# Patient Record
Sex: MALE | Race: OTHER | HISPANIC OR LATINO | Employment: UNEMPLOYED | URBAN - METROPOLITAN AREA
[De-identification: names, ages, dates, MRNs, and addresses within clinical notes are randomized per-mention and may not be internally consistent; named-entity substitution may affect disease eponyms.]

---

## 2019-08-30 ENCOUNTER — OCCMED (OUTPATIENT)
Dept: URGENT CARE | Age: 19
End: 2019-08-30
Payer: OTHER MISCELLANEOUS

## 2019-08-30 ENCOUNTER — APPOINTMENT (OUTPATIENT)
Dept: RADIOLOGY | Age: 19
End: 2019-08-30
Attending: FAMILY MEDICINE
Payer: OTHER MISCELLANEOUS

## 2019-08-30 DIAGNOSIS — T14.90XA INJURY: Primary | ICD-10-CM

## 2019-08-30 DIAGNOSIS — T14.90XA INJURY: ICD-10-CM

## 2019-08-30 PROCEDURE — 73130 X-RAY EXAM OF HAND: CPT

## 2019-08-30 PROCEDURE — G0382 LEV 3 HOSP TYPE B ED VISIT: HCPCS | Performed by: FAMILY MEDICINE

## 2019-08-30 PROCEDURE — 99283 EMERGENCY DEPT VISIT LOW MDM: CPT | Performed by: FAMILY MEDICINE

## 2019-09-27 ENCOUNTER — TRANSCRIBE ORDERS (OUTPATIENT)
Dept: ADMINISTRATIVE | Facility: HOSPITAL | Age: 19
End: 2019-09-27

## 2019-09-27 DIAGNOSIS — M25.539 PAIN IN WRIST, UNSPECIFIED LATERALITY: Primary | ICD-10-CM

## 2020-08-18 ENCOUNTER — APPOINTMENT (EMERGENCY)
Dept: RADIOLOGY | Facility: HOSPITAL | Age: 20
End: 2020-08-18

## 2020-08-18 ENCOUNTER — HOSPITAL ENCOUNTER (EMERGENCY)
Facility: HOSPITAL | Age: 20
Discharge: HOME | End: 2020-08-18
Attending: EMERGENCY MEDICINE | Admitting: EMERGENCY MEDICINE

## 2020-08-18 VITALS
SYSTOLIC BLOOD PRESSURE: 142 MMHG | RESPIRATION RATE: 18 BRPM | OXYGEN SATURATION: 99 % | TEMPERATURE: 97.7 F | HEART RATE: 79 BPM | DIASTOLIC BLOOD PRESSURE: 79 MMHG

## 2020-08-18 DIAGNOSIS — S93.491A SPRAIN OF ANTERIOR TALOFIBULAR LIGAMENT, RIGHT, INITIAL ENCOUNTER: Primary | ICD-10-CM

## 2020-08-18 PROCEDURE — 99283 EMERGENCY DEPT VISIT LOW MDM: CPT

## 2020-08-18 PROCEDURE — 99284 EMERGENCY DEPT VISIT MOD MDM: CPT | Performed by: EMERGENCY MEDICINE

## 2020-08-18 PROCEDURE — 96372 THER/PROPH/DIAG INJ SC/IM: CPT

## 2020-08-18 PROCEDURE — 73610 X-RAY EXAM OF ANKLE: CPT

## 2020-08-18 RX ORDER — ACETAMINOPHEN 325 MG/1
975 TABLET ORAL ONCE
Status: COMPLETED | OUTPATIENT
Start: 2020-08-18 | End: 2020-08-18

## 2020-08-18 RX ORDER — KETOROLAC TROMETHAMINE 30 MG/ML
15 INJECTION, SOLUTION INTRAMUSCULAR; INTRAVENOUS ONCE
Status: COMPLETED | OUTPATIENT
Start: 2020-08-18 | End: 2020-08-18

## 2020-08-18 RX ADMIN — KETOROLAC TROMETHAMINE 15 MG: 30 INJECTION, SOLUTION INTRAMUSCULAR at 22:38

## 2020-08-18 RX ADMIN — ACETAMINOPHEN 975 MG: 325 TABLET ORAL at 22:37

## 2020-08-19 NOTE — ED ATTENDING ATTESTATION
8/18/2020  IGreta DO, saw and evaluated the patient  I have discussed the patient with the resident/non-physician practitioner and agree with the resident's/non-physician practitioner's findings, Plan of Care, and MDM as documented in the resident's/non-physician practitioner's note, except where noted  All available labs and Radiology studies were reviewed  I was present for key portions of any procedure(s) performed by the resident/non-physician practitioner and I was immediately available to provide assistance  At this point I agree with the current assessment done in the Emergency Department  I have conducted an independent evaluation of this patient a history and physical is as follows:    ED Course     Pt seen and examined  Healthy 22 yo male with 2 weeks of R ankle pain after inversion injury while playing softball  He states he felt immediate pain after twisting but was able to ambulate afterwards  Has tried RICE which was initially helpful but he is still having pain and swelling  Tender R lateral malleoli, NV intact distally  Will check xray  Xray neg for acute findings, no fractuers noted       Final diagnoses:   Sprain of anterior talofibular ligament, right, initial encounter         Critical Care Time  Procedures

## 2020-08-19 NOTE — ED PROVIDER NOTES
History  Chief Complaint   Patient presents with    Ankle Injury     injured right ankle two weeks ago playing softball, no deformity     24-year-old male no relevant past medical history presenting to the ED for evaluation of 2 weeks of right ankle pain after inversion injury while playing softball  He states he felt immediate pain but was able to ambulate afterwards  Utilized ibuprofen at home, elevation, and ice initially and not somewhat helped but he is still having pain and swelling  He denies any systemic symptoms such as fever chills  Review of systems otherwise negative  He states he did not come and then because he thought it would improve, and states he came in tonight because his symptoms have persisted  No previous injuries or surgeries to the right ankle  He denies any midfoot tenderness, no numbness, tingling or weakness, no color change, no proximal fibular or tibia pain  His pain is made worse with ambulation, and dorsiflexion/plantar flexion of his right ankle  He is endorsing his pain as a constant dull ache  History provided by:  Patient   used: No    Ankle Injury   Onset quality:  Sudden  Duration:  2 weeks  Timing:  Constant  Progression:  Partially resolved  Chronicity:  New  Associated symptoms: no fever        None       History reviewed  No pertinent past medical history  History reviewed  No pertinent surgical history  History reviewed  No pertinent family history  I have reviewed and agree with the history as documented  E-Cigarette/Vaping     E-Cigarette/Vaping Substances     Social History     Tobacco Use    Smoking status: Not on file   Substance Use Topics    Alcohol use: Not on file    Drug use: Not on file        Review of Systems   Constitutional: Negative  Negative for fever  HENT: Negative  Eyes: Negative  Respiratory: Negative  Cardiovascular: Negative  Gastrointestinal: Negative  Endocrine: Negative  Genitourinary: Negative  Musculoskeletal: Positive for arthralgias  Skin: Negative  Allergic/Immunologic: Negative  Neurological: Negative  Hematological: Negative  Psychiatric/Behavioral: Negative  Physical Exam  ED Triage Vitals [08/18/20 2220]   Temperature Pulse Respirations Blood Pressure SpO2   97 7 °F (36 5 °C) 79 18 142/79 99 %      Temp src Heart Rate Source Patient Position - Orthostatic VS BP Location FiO2 (%)   -- Monitor -- -- --      Pain Score       --             Orthostatic Vital Signs  Vitals:    08/18/20 2220   BP: 142/79   Pulse: 79       Physical Exam  Vitals signs reviewed  Constitutional:       General: He is not in acute distress  Appearance: Normal appearance  He is normal weight  HENT:      Head: Normocephalic and atraumatic  Nose: Nose normal    Eyes:      General: No scleral icterus  Extraocular Movements: Extraocular movements intact  Conjunctiva/sclera: Conjunctivae normal    Neck:      Musculoskeletal: Normal range of motion  Cardiovascular:      Rate and Rhythm: Normal rate and regular rhythm  Pulses: Normal pulses  Pulmonary:      Effort: Pulmonary effort is normal  No respiratory distress  Abdominal:      General: Abdomen is flat  Musculoskeletal: Normal range of motion  Right ankle: He exhibits swelling and ecchymosis  He exhibits normal range of motion and no deformity  Tenderness  Lateral malleolus and AITFL tenderness found  No CF ligament, no posterior TFL, no head of 5th metatarsal and no proximal fibula tenderness found  Achilles tendon exhibits no pain, no defect and normal Gordon's test results  Comments: Normal ROM R ankle, motor/sensory intact  2+ DP/PT pulses b/l LEs  Ecchymosis/swelling overlying ATFL w/ tenderness, mild tenderness over lat malleolus  Skin:     General: Skin is warm and dry  Capillary Refill: Capillary refill takes less than 2 seconds     Neurological:      General: No focal deficit present  Mental Status: He is alert and oriented to person, place, and time  Psychiatric:         Mood and Affect: Mood normal          Behavior: Behavior normal          Thought Content: Thought content normal          Judgment: Judgment normal          ED Medications  Medications   ketorolac (TORADOL) injection 15 mg (15 mg Intramuscular Given 8/18/20 2238)   acetaminophen (TYLENOL) tablet 975 mg (975 mg Oral Given 8/18/20 2237)       Diagnostic Studies  Results Reviewed     None                 XR ankle 3+ views RIGHT   ED Interpretation by Lino Moss DO (08/18 2248)   No acute osseous abnormality              Procedures  Splint application    Date/Time: 8/18/2020 11:02 PM  Performed by: Lino Moss DO  Authorized by: Lino Moss DO     Patient location:  Bedside  Performing Provider:  Tech  Consent:     Consent obtained:  Verbal    Consent given by:  Patient  Universal protocol:     Procedure explained and questions answered to patient or proxy's satisfaction: yes    Indication:     Indications: sprain/strain    Pre-procedure details:     Sensation:  Normal  Procedure details:     Laterality:  Right    Location:  Ankle    Ankle:  R ankle    Splint type: aircast ankle splint  Post-procedure details:     Pain:  Improved    Sensation:  Normal    Neurovascular Exam: skin pink, capillary refill <2 sec, normal pulses and skin intact, warm, and dry      Patient tolerance of procedure: Tolerated well, no immediate complications          ED Course       US AUDIT      Most Recent Value   Initial Alcohol Screen: US AUDIT-C    1  How often do you have a drink containing alcohol?  0 Filed at: 08/18/2020 2220   2  How many drinks containing alcohol do you have on a typical day you are drinking? 0 Filed at: 08/18/2020 2220   3a  Male UNDER 65: How often do you have five or more drinks on one occasion? 0 Filed at: 08/18/2020 2220   3b  FEMALE Any Age, or MALE 65+:  How often do you have 4 or more drinks on one occassion? 0 Filed at: 08/18/2020 2220   Audit-C Score  0 Filed at: 08/18/2020 2220                  PRERNA/DAST-10      Most Recent Value   How many times in the past year have you    Used an illegal drug or used a prescription medication for non-medical reasons? Never Filed at: 08/18/2020 2220                              [facility]  Number of Diagnoses or Management Options  Sprain of anterior talofibular ligament, right, initial encounter: new and requires workup  Diagnosis management comments: Well-appearing 25-year-old male presenting 2 weeks after right ankle injury  He is neurovascularly intact normal range of motion with mild swelling and tenderness over the ATFL, slight tenderness over the lateral malleolus  Obtain x-ray, give Tylenol and Toradol for pain  No fractures  Normal alignment  Aircast splint applied by tech, improved comfort  Discharge home, patient given written instructions, told to follow-up with orthopedic surgery if his symptoms persist   Questions were answered prior to discharge  Amount and/or Complexity of Data Reviewed  Tests in the radiology section of CPT®: ordered and reviewed  Review and summarize past medical records: yes  Independent visualization of images, tracings, or specimens: yes          Disposition  Final diagnoses:   Sprain of anterior talofibular ligament, right, initial encounter     Time reflects when diagnosis was documented in both MDM as applicable and the Disposition within this note     Time User Action Codes Description Comment    8/18/2020 10:48 PM Meri Treviño Add [N79 118D] Sprain of anterior talofibular ligament, right, initial encounter       ED Disposition     ED Disposition Condition Date/Time Comment    Discharge Stable Tue Aug 18, 2020 10:49 PM Bill Burton discharge to home/self care              Follow-up Information     Follow up With Specialties Details Why Contact Info Additional [address] [facility] Emergency Department Emergency Medicine Go to  As needed, If symptoms worsen Connie [id number] [phone] [place] ED, [address] [facility] [address]  , [place], [place], [address] Orthopedic Surgery Call in 1 week  [address]  [facility] [place] [id number] [phone] [address], [address], [address] [place] [address], [id number]   [phone]          There are no discharge medications for this patient  No discharge procedures on file  PDMP Review     None           ED Provider  Attending physically available and evaluated Lucy Plaza I managed the patient along with the ED Attending      Electronically Signed by         Maria E Rodriges DO  08/18/20 1804

## 2020-08-19 NOTE — DISCHARGE INSTRUCTIONS
tome tylenol e ibuprofeno según sea necesario para el dolor  puede aplicar ny almohadilla térmica en el tobillo para aliviar el dolor y la hinchazón  cuando esté en casa, eleve el tobillo para mejorar la hinchazón  use lynn férula para mayor comodidad, soporte de peso según lo tolere  realice un seguimiento con ortopedia en 1 semana si shannan síntomas persisten

## 2021-01-15 ENCOUNTER — HOSPITAL ENCOUNTER (EMERGENCY)
Facility: HOSPITAL | Age: 21
Discharge: HOME | End: 2021-01-15
Attending: EMERGENCY MEDICINE

## 2021-01-15 VITALS
WEIGHT: 206.13 LBS | HEART RATE: 76 BPM | TEMPERATURE: 98.5 F | OXYGEN SATURATION: 100 % | SYSTOLIC BLOOD PRESSURE: 143 MMHG | DIASTOLIC BLOOD PRESSURE: 85 MMHG | RESPIRATION RATE: 16 BRPM

## 2021-01-15 DIAGNOSIS — N52.9 INABILITY TO MAINTAIN ERECTION: Primary | ICD-10-CM

## 2021-01-15 LAB
ANION GAP SERPL CALCULATED.3IONS-SCNC: 5 MMOL/L (ref 4–13)
BASOPHILS # BLD AUTO: 0.04 THOUSANDS/ΜL (ref 0–0.1)
BASOPHILS NFR BLD AUTO: 1 % (ref 0–1)
BILIRUB UR QL STRIP: NEGATIVE
BUN SERPL-MCNC: 13 MG/DL (ref 5–25)
CALCIUM SERPL-MCNC: 9.7 MG/DL (ref 8.3–10.1)
CHLORIDE SERPL-SCNC: 101 MMOL/L (ref 100–108)
CLARITY UR: CLEAR
CLARITY, POC: CLEAR
CO2 SERPL-SCNC: 31 MMOL/L (ref 21–32)
COLOR UR: YELLOW
COLOR, POC: YELLOW
CREAT SERPL-MCNC: 1.02 MG/DL (ref 0.6–1.3)
EOSINOPHIL # BLD AUTO: 0.07 THOUSAND/ΜL (ref 0–0.61)
EOSINOPHIL NFR BLD AUTO: 1 % (ref 0–6)
ERYTHROCYTE [DISTWIDTH] IN BLOOD BY AUTOMATED COUNT: 13.9 % (ref 11.6–15.1)
GFR SERPL CREATININE-BSD FRML MDRD: 121 ML/MIN/1.73SQ M
GLUCOSE SERPL-MCNC: 103 MG/DL (ref 65–140)
GLUCOSE UR STRIP-MCNC: NEGATIVE MG/DL
HCT VFR BLD AUTO: 51.7 % (ref 36.5–49.3)
HGB BLD-MCNC: 16.5 G/DL (ref 12–17)
HGB UR QL STRIP.AUTO: NEGATIVE
IMM GRANULOCYTES # BLD AUTO: 0.02 THOUSAND/UL (ref 0–0.2)
IMM GRANULOCYTES NFR BLD AUTO: 0 % (ref 0–2)
KETONES UR STRIP-MCNC: NEGATIVE MG/DL
LEUKOCYTE ESTERASE UR QL STRIP: NEGATIVE
LYMPHOCYTES # BLD AUTO: 2.3 THOUSANDS/ΜL (ref 0.6–4.47)
LYMPHOCYTES NFR BLD AUTO: 28 % (ref 14–44)
MCH RBC QN AUTO: 25.7 PG (ref 26.8–34.3)
MCHC RBC AUTO-ENTMCNC: 31.9 G/DL (ref 31.4–37.4)
MCV RBC AUTO: 80 FL (ref 82–98)
MONOCYTES # BLD AUTO: 0.73 THOUSAND/ΜL (ref 0.17–1.22)
MONOCYTES NFR BLD AUTO: 9 % (ref 4–12)
NEUTROPHILS # BLD AUTO: 5.05 THOUSANDS/ΜL (ref 1.85–7.62)
NEUTS SEG NFR BLD AUTO: 61 % (ref 43–75)
NITRITE UR QL STRIP: NEGATIVE
NRBC BLD AUTO-RTO: 0 /100 WBCS
PH UR STRIP.AUTO: 6.5 [PH] (ref 4.5–8)
PLATELET # BLD AUTO: 249 THOUSANDS/UL (ref 149–390)
PMV BLD AUTO: 9.9 FL (ref 8.9–12.7)
POTASSIUM SERPL-SCNC: 4.1 MMOL/L (ref 3.5–5.3)
PROT UR STRIP-MCNC: NEGATIVE MG/DL
RBC # BLD AUTO: 6.43 MILLION/UL (ref 3.88–5.62)
SODIUM SERPL-SCNC: 137 MMOL/L (ref 136–145)
SP GR UR STRIP.AUTO: 1.01 (ref 1–1.03)
UROBILINOGEN UR QL STRIP.AUTO: 0.2 E.U./DL
WBC # BLD AUTO: 8.21 THOUSAND/UL (ref 4.31–10.16)

## 2021-01-15 PROCEDURE — 36415 COLL VENOUS BLD VENIPUNCTURE: CPT | Performed by: EMERGENCY MEDICINE

## 2021-01-15 PROCEDURE — 85025 COMPLETE CBC W/AUTO DIFF WBC: CPT | Performed by: EMERGENCY MEDICINE

## 2021-01-15 PROCEDURE — 80048 BASIC METABOLIC PNL TOTAL CA: CPT | Performed by: EMERGENCY MEDICINE

## 2021-01-15 PROCEDURE — 81003 URINALYSIS AUTO W/O SCOPE: CPT

## 2021-01-15 PROCEDURE — 99283 EMERGENCY DEPT VISIT LOW MDM: CPT

## 2021-01-15 PROCEDURE — 99282 EMERGENCY DEPT VISIT SF MDM: CPT | Performed by: EMERGENCY MEDICINE

## 2021-01-15 NOTE — Clinical Note
Abelardo Walter was seen and treated in our emergency department on 1/15/2021  Diagnosis:     Wendy Lopez  may return to work on return date  He may return on this date: 01/18/2021         If you have any questions or concerns, please don't hesitate to call        Ana Rosa Medina MD    ______________________________           _______________          _______________  Hospital Representative                              Date                                Time

## 2021-01-16 NOTE — ED PROVIDER NOTES
History  Chief Complaint   Patient presents with    Medical Problem     cyracom blue phone used for interpretation  pt states he is here to get his sugar and his blood pressure checked  when asked why he has concern for issues with sugar or BP pts states, "because for the last week or so I am having trouble getting it up in my parts down there and I saw red "  pt also reports his mouth tastes sweet and concerned because he has been drinking alcohol for the last month   when asked if he wants help for it he continues to speak about his penis problem and text on phone      18-year-old male presented for evaluation of erectile dysfunction  Patient states that over this past month he has been drinking more alcohol than normal   He states that several times last week he was unable to obtain a good quality erection on several occasions  States it is working but not like it usually does this seems to coincide with the heavy alcohol use  He otherwise has no complaints  He specifically denies headache or neck pain, numbness, weakness, tingling, chest pain, shortness of breath nausea, vomiting, diarrhea, or urinary symptoms  No testicular pain or swelling  No penile discharge  He is requesting to [facility] my blood sugar checked        History provided by:  Patient   used: No    Medical Problem  Location:  Penis  Quality:  Limp  Severity:  Mild  Onset quality:  Gradual  Timing:  Intermittent  Progression:  Improving  Chronicity:  New  Context:  EtOH intox  Relieved by:  Abstaining from alcohol  Worsened by:  Drinking alcohol  Ineffective treatments:  None tried  Associated symptoms: no abdominal pain, no chest pain, no cough, no ear pain, no fever, no nausea, no rash, no shortness of breath, no sore throat and no vomiting        None       History reviewed  No pertinent past medical history  History reviewed  No pertinent surgical history  History reviewed  No pertinent family history    I have reviewed and agree with the history as documented  E-Cigarette/Vaping     E-Cigarette/Vaping Substances     Social History     Tobacco Use    Smoking status: Never Smoker    Smokeless tobacco: Never Used   Substance Use Topics    Alcohol use: Yes    Drug use: Yes       Review of Systems   Constitutional: Negative for chills and fever  HENT: Negative for ear pain and sore throat  Eyes: Negative for pain and visual disturbance  Respiratory: Negative for cough and shortness of breath  Cardiovascular: Negative for chest pain and palpitations  Gastrointestinal: Negative for abdominal pain, nausea and vomiting  Genitourinary: Negative for dysuria and hematuria  Musculoskeletal: Negative for arthralgias and back pain  Skin: Negative for color change and rash  Neurological: Negative for seizures and syncope  All other systems reviewed and are negative  Physical Exam  Physical Exam  Vitals signs and nursing note reviewed  Constitutional:       Appearance: He is well-developed  HENT:      Head: Normocephalic and atraumatic  Mouth/Throat:      Mouth: Mucous membranes are moist       Pharynx: Oropharynx is clear  Eyes:      Conjunctiva/sclera: Conjunctivae normal    Neck:      Musculoskeletal: Normal range of motion and neck supple  No neck rigidity or muscular tenderness  Cardiovascular:      Rate and Rhythm: Normal rate and regular rhythm  Heart sounds: No murmur  Pulmonary:      Effort: Pulmonary effort is normal  No respiratory distress  Breath sounds: Normal breath sounds  Abdominal:      Palpations: Abdomen is soft  Tenderness: There is no abdominal tenderness  Musculoskeletal: Normal range of motion  General: No swelling or tenderness  Skin:     General: Skin is warm and dry  Capillary Refill: Capillary refill takes less than 2 seconds  Neurological:      General: No focal deficit present        Mental Status: He is alert and oriented to person, place, and time           Vital Signs  ED Triage Vitals   Temperature Pulse Respirations Blood Pressure SpO2   01/15/21 1724 01/15/21 1724 01/15/21 1724 01/15/21 1724 01/15/21 1724   98 5 °F (36 9 °C) 82 16 149/86 99 %      Temp Source Heart Rate Source Patient Position - Orthostatic VS BP Location FiO2 (%)   01/15/21 1724 01/15/21 1956 01/15/21 1956 01/15/21 1956 --   Oral Monitor Sitting Right arm       Pain Score       --                  Vitals:    01/15/21 1724 01/15/21 1956   BP: 149/86 143/85   Pulse: 82 76   Patient Position - Orthostatic VS:  Sitting         Visual Acuity      ED Medications  Medications - No data to display    Diagnostic Studies  Results Reviewed     Procedure Component Value Units Date/Time    Basic metabolic panel [[id number]] Collected: 01/15/21 1919    Lab Status: Final result Specimen: Blood from Arm, Left Updated: 01/15/21 1942     Sodium 137 mmol/L      Potassium 4 1 mmol/L      Chloride 101 mmol/L      CO2 31 mmol/L      ANION GAP 5 mmol/L      BUN 13 mg/dL      Creatinine 1 02 mg/dL      Glucose 103 mg/dL      Calcium 9 7 mg/dL      eGFR 121 ml/min/1 73sq m     Narrative:      Noemí guidelines for Chronic Kidney Disease (CKD):     Stage 1 with normal or high GFR (GFR > 90 mL/min/1 73 square meters)    Stage 2 Mild CKD (GFR = 60-89 mL/min/1 73 square meters)    Stage 3A Moderate CKD (GFR = 45-59 mL/min/1 73 square meters)    Stage 3B Moderate CKD (GFR = 30-44 mL/min/1 73 square meters)    Stage 4 Severe CKD (GFR = 15-29 mL/min/1 73 square meters)    Stage 5 End Stage CKD (GFR <15 mL/min/1 73 square meters)  Note: GFR calculation is accurate only with a steady state creatinine    POCT urinalysis dipstick [[id number]]  (Normal) Resulted: 01/15/21 1924    Lab Status: Final result Specimen: Urine Updated: 01/15/21 1924     Color, UA yellow     Clarity, UA clear    CBC and differential [[id number]]  (Abnormal) Collected: 01/15/21 1919 Lab Status: Final result Specimen: Blood from Arm, Left Updated: 01/15/21 1924     WBC 8 21 Thousand/uL      RBC 6 43 Million/uL      Hemoglobin 16 5 g/dL      Hematocrit 51 7 %      MCV 80 fL      MCH 25 7 pg      MCHC 31 9 g/dL      RDW 13 9 %      MPV 9 9 fL      Platelets 154 Thousands/uL      nRBC 0 /100 WBCs      Neutrophils Relative 61 %      Immat GRANS % 0 %      Lymphocytes Relative 28 %      Monocytes Relative 9 %      Eosinophils Relative 1 %      Basophils Relative 1 %      Neutrophils Absolute 5 05 Thousands/µL      Immature Grans Absolute 0 02 Thousand/uL      Lymphocytes Absolute 2 30 Thousands/µL      Monocytes Absolute 0 73 Thousand/µL      Eosinophils Absolute 0 07 Thousand/µL      Basophils Absolute 0 04 Thousands/µL     Urine Macroscopic, POC [[id number]] Collected: 01/15/21 1922    Lab Status: Final result Specimen: Urine Updated: 01/15/21 1923     Color, UA Yellow     Clarity, UA Clear     pH, UA 6 5     Leukocytes, UA Negative     Nitrite, UA Negative     Protein, UA Negative mg/dl      Glucose, UA Negative mg/dl      Ketones, UA Negative mg/dl      Urobilinogen, UA 0 2 E U /dl      Bilirubin, UA Negative     Blood, UA Negative     Specific Gravity, UA 1 010    Narrative:      CLIN[facility] RESULT                 No orders to display              Procedures  Procedures         ED Course                                           MDM  Number of Diagnoses or Management Options  Inability to maintain erection: new and requires workup  Diagnosis management comments: A 60-year-old male came requesting laboratory evaluation for diabetes after having some difficulty maintaining a normal erection after drinking a lot of alcohol  His laboratory studies are unremarkable  Urinalysis is also unremarkable  Patient has no other complaints  We will plan to discharge with PCP follow-up  Discussed return precautions         Amount and/or Complexity of Data Reviewed  Review and summarize past medical records: yes        Disposition  Final diagnoses:   Inability to maintain erection     Time reflects when diagnosis was documented in both MDM as applicable and the Disposition within this note     Time User Action Codes Description Comment    1/15/2021  7:56 PM Betzy Whiteside Add [N52 9] Inability to maintain erection       ED Disposition     ED Disposition Condition Date/Time Comment    Discharge Stable Fri Mani 15, 2021  7:56 PM Claudeen Postal discharge to home/self care  Follow-up Information     Follow up With Specialties Details Why [address] Family Medicine   [address] [place] [address]  [phone]            Patient's Medications    No medications on file     No discharge procedures on file      PDMP Review     None          ED Provider  Electronically Signed by           Rosy Sandoval MD  01/15/21 1958

## 2021-04-15 ENCOUNTER — HOSPITAL ENCOUNTER (EMERGENCY)
Facility: HOSPITAL | Age: 21
Discharge: HOME | End: 2021-04-15
Attending: EMERGENCY MEDICINE | Admitting: EMERGENCY MEDICINE
Payer: COMMERCIAL

## 2021-04-15 ENCOUNTER — APPOINTMENT (EMERGENCY)
Dept: RADIOLOGY | Facility: HOSPITAL | Age: 21
End: 2021-04-15
Payer: COMMERCIAL

## 2021-04-15 VITALS
DIASTOLIC BLOOD PRESSURE: 78 MMHG | OXYGEN SATURATION: 100 % | SYSTOLIC BLOOD PRESSURE: 149 MMHG | HEART RATE: 61 BPM | WEIGHT: 205.03 LBS | RESPIRATION RATE: 16 BRPM | TEMPERATURE: 97.9 F

## 2021-04-15 DIAGNOSIS — R07.89 CHEST WALL PAIN: Primary | ICD-10-CM

## 2021-04-15 DIAGNOSIS — F41.8 ANXIETY ABOUT HEALTH: ICD-10-CM

## 2021-04-15 LAB
ATRIAL RATE: 70 BPM
GLUCOSE SERPL-MCNC: 99 MG/DL (ref 65–140)
P AXIS: 36 DEGREES
PR INTERVAL: 162 MS
QRS AXIS: 8 DEGREES
QRSD INTERVAL: 96 MS
QT INTERVAL: 404 MS
QTC INTERVAL: 436 MS
T WAVE AXIS: 1 DEGREES
VENTRICULAR RATE: 70 BPM

## 2021-04-15 PROCEDURE — 82948 REAGENT STRIP/BLOOD GLUCOSE: CPT

## 2021-04-15 PROCEDURE — 93005 ELECTROCARDIOGRAM TRACING: CPT

## 2021-04-15 PROCEDURE — 99285 EMERGENCY DEPT VISIT HI MDM: CPT | Performed by: EMERGENCY MEDICINE

## 2021-04-15 PROCEDURE — 71046 X-RAY EXAM CHEST 2 VIEWS: CPT

## 2021-04-15 PROCEDURE — 99285 EMERGENCY DEPT VISIT HI MDM: CPT

## 2021-04-15 PROCEDURE — 93010 ELECTROCARDIOGRAM REPORT: CPT | Performed by: INTERNAL MEDICINE

## 2021-04-15 RX ORDER — NAPROXEN 500 MG/1
500 TABLET ORAL EVERY 12 HOURS PRN
Qty: 15 TABLET | Refills: 0 | Status: SHIPPED | OUTPATIENT
Start: 2021-04-15 | End: 2021-06-19

## 2021-04-15 NOTE — ED PROVIDER NOTES
History  Chief Complaint   Patient presents with    Chest Pain     Patient reports chest pain and left arm pain  23-year-old male with no past medical history presents to the ED with chest pain for a while  He states it is getting worse over the last couple a days  He states around the center of his chest and radiates to the left shoulder  He cannot describe the pain  No associated diaphoresis, shortness of breath or vomiting  The chest pain is not exertional   He states he gets worse when he stretches his arms  He did not take anything for the pain  Patient also would like to be checked for diabetes  History provided by:  Patient   used: Yes    Chest Pain  Pain location:  Substernal area  Pain quality comment:  Cannot describe  Pain radiates to:  L shoulder  Pain radiates to the back: no    Pain severity:  Unable to specify  Onset quality:  Unable to specify  Timing:  Constant  Progression:  Worsening  Chronicity:  New  Context: at rest    Context comment:  Stretching arms  Relieved by:  None tried  Worsened by:  Certain positions  Ineffective treatments:  None tried  Associated symptoms: no abdominal pain, no altered mental status, no anorexia, no anxiety, no back pain, no claudication, no cough, no diaphoresis, no dizziness, no dysphagia, no fatigue, no fever, no headache, no heartburn, no lower extremity edema, no nausea, no near-syncope, no numbness, no orthopnea, no palpitations, no PND, no shortness of breath, no syncope, not vomiting and no weakness    Risk factors: male sex    Risk factors: no coronary artery disease, no diabetes mellitus, no high cholesterol, no hypertension, no immobilization, not obese, no prior DVT/PE, no smoking and no surgery        None       History reviewed  No pertinent past medical history  History reviewed  No pertinent surgical history  History reviewed  No pertinent family history    I have reviewed and agree with the history as documented  E-Cigarette/Vaping     E-Cigarette/Vaping Substances     Social History     Tobacco Use    Smoking status: Never Smoker    Smokeless tobacco: Never Used   Substance Use Topics    Alcohol use: Yes    Drug use: Yes       Review of Systems   Constitutional: Negative  Negative for diaphoresis, fatigue and fever  HENT: Negative  Negative for trouble swallowing  Eyes: Negative  Respiratory: Negative  Negative for cough and shortness of breath  Cardiovascular: Positive for chest pain  Negative for palpitations, orthopnea, claudication, leg swelling, syncope, PND and near-syncope  Gastrointestinal: Negative  Negative for abdominal pain, anorexia, heartburn, nausea and vomiting  Genitourinary: Negative  Musculoskeletal: Negative for back pain and neck pain  Skin: Negative  Allergic/Immunologic: Negative  Neurological: Negative  Negative for dizziness, weakness, numbness and headaches  Hematological: Negative  Psychiatric/Behavioral: Negative  All other systems reviewed and are negative  Physical Exam  Physical Exam  Vitals signs and nursing note reviewed  Constitutional:       General: He is awake  He is not in acute distress  Appearance: Normal appearance  He is well-developed and normal weight  He is not ill-appearing, toxic-appearing or diaphoretic  HENT:      Head: Normocephalic and atraumatic  Right Ear: External ear normal       Left Ear: External ear normal       Nose: Nose normal    Eyes:      General: No scleral icterus  Conjunctiva/sclera: Conjunctivae normal    Neck:      Thyroid: No thyromegaly  Vascular: No JVD  Cardiovascular:      Rate and Rhythm: Normal rate and regular rhythm  Heart sounds: Normal heart sounds  No murmur  No friction rub  No gallop  Pulmonary:      Effort: Pulmonary effort is normal  No respiratory distress  Breath sounds: Normal breath sounds  No stridor  No wheezing, rhonchi or rales  Chest:      Chest wall: Tenderness present  Abdominal:      General: Bowel sounds are normal  There is no distension  Palpations: Abdomen is soft  There is no mass  Tenderness: There is no abdominal tenderness  Hernia: No hernia is present  Musculoskeletal: Normal range of motion  General: No tenderness or deformity  Right lower leg: No edema  Left lower leg: No edema  Skin:     General: Skin is warm and dry  Coloration: Skin is not jaundiced or pale  Findings: No bruising, erythema, lesion or rash  Neurological:      General: No focal deficit present  Mental Status: He is alert and oriented to person, place, and time  Motor: No weakness  Deep Tendon Reflexes: Reflexes are normal and symmetric  Psychiatric:         Mood and Affect: Mood normal          Behavior: Behavior is cooperative           Vital Signs  ED Triage Vitals [04/15/21 0757]   Temperature Pulse Respirations Blood Pressure SpO2   97 9 °F (36 6 °C) 61 16 149/78 100 %      Temp Source Heart Rate Source Patient Position - Orthostatic VS BP Location FiO2 (%)   Oral Monitor -- -- --      Pain Score       6           Vitals:    04/15/21 0757   BP: 149/78   Pulse: 61         Visual Acuity      ED Medications  Medications - No data to display    Diagnostic Studies  Results Reviewed     None                 XR chest 2 views    (Results Pending)              Procedures  ECG 12 Lead Documentation Only    Date/Time: 4/15/2021 8:20 AM  Performed by: Samina Rosa DO  Authorized by: Samina Rosa DO     Indications / Diagnosis:  Cp  ECG reviewed by me, the ED Provider: yes    Patient location:  ED  Interpretation:     Interpretation: normal    Rate:     ECG rate:  70    ECG rate assessment: normal    Rhythm:     Rhythm: sinus rhythm    Ectopy:     Ectopy: PVCs      PVCs:  Infrequent  Conduction:     Conduction: normal    ST segments:     ST segments:  Normal  T waves:     T waves: normal               ED Course                             SBIRT 20yo+      Most Recent Value   SBIRT (22 yo +)   In order to provide better care to our patients, we are screening all of our patients for alcohol and drug use  Would it be okay to ask you these screening questions? Yes Filed at: 04/15/2021 4364   Initial Alcohol Screen: US AUDIT-C    1  How often do you have a drink containing alcohol?  0 Filed at: 04/15/2021 0802   2  How many drinks containing alcohol do you have on a typical day you are drinking? 0 Filed at: 04/15/2021 0802   3a  Male UNDER 65: How often do you have five or more drinks on one occasion? 0 Filed at: 04/15/2021 0802   3b  FEMALE Any Age, or MALE 65+: How often do you have 4 or more drinks on one occassion? 0 Filed at: 04/15/2021 0802   Audit-C Score  0 Filed at: 04/15/2021 9935   PRERNA: How many times in the past year have you    Used an illegal drug or used a prescription medication for non-medical reasons? Never Filed at: 04/15/2021 0802                    MDM  Number of Diagnoses or Management Options  Diagnosis management comments: 71-year-old male presents with central chest pain for a while getting worse over the last few days  He states it does radiate to the left shoulder but no other associated symptoms  The pain seems to get worse when he stretches his arms  He did not take anything for the pain  On exam he is alert no acute distress  His exam here is normal   He really has no risk factors for ACS  I suspect his symptoms are secondary to musculoskeletal pain  He would also like to be checked for diabetes although he has no symptoms consistent with diabetes    Will check EKG, chest x-ray and Accu-Chek       Amount and/or Complexity of Data Reviewed  Clinical lab tests: ordered and reviewed  Tests in the radiology section of CPT®: ordered and reviewed  Independent visualization of images, tracings, or specimens: yes        Disposition  Final diagnoses:   None ED Disposition     None      Follow-up Information    None         Patient's Medications    No medications on file     No discharge procedures on file      PDMP Review     None          ED Provider  Electronically Signed by           Darlene Wayne DO  04/15/21 7945

## 2021-04-28 ENCOUNTER — HOSPITAL ENCOUNTER (EMERGENCY)
Facility: HOSPITAL | Age: 21
Discharge: HOME | End: 2021-04-28
Attending: EMERGENCY MEDICINE | Admitting: EMERGENCY MEDICINE
Payer: COMMERCIAL

## 2021-04-28 VITALS
RESPIRATION RATE: 18 BRPM | TEMPERATURE: 98.1 F | OXYGEN SATURATION: 99 % | SYSTOLIC BLOOD PRESSURE: 154 MMHG | HEART RATE: 68 BPM | DIASTOLIC BLOOD PRESSURE: 71 MMHG

## 2021-04-28 DIAGNOSIS — Z00.00 NORMAL PHYSICAL EXAM: Primary | ICD-10-CM

## 2021-04-28 DIAGNOSIS — R52 GENERALIZED PAIN: ICD-10-CM

## 2021-04-28 LAB
ANION GAP SERPL CALCULATED.3IONS-SCNC: 9 MMOL/L (ref 4–13)
BASOPHILS # BLD AUTO: 0.05 THOUSANDS/ΜL (ref 0–0.1)
BASOPHILS NFR BLD AUTO: 1 % (ref 0–1)
BUN SERPL-MCNC: 16 MG/DL (ref 5–25)
CALCIUM SERPL-MCNC: 9.2 MG/DL (ref 8.3–10.1)
CHLORIDE SERPL-SCNC: 102 MMOL/L (ref 100–108)
CO2 SERPL-SCNC: 27 MMOL/L (ref 21–32)
CREAT SERPL-MCNC: 0.96 MG/DL (ref 0.6–1.3)
EOSINOPHIL # BLD AUTO: 0.05 THOUSAND/ΜL (ref 0–0.61)
EOSINOPHIL NFR BLD AUTO: 1 % (ref 0–6)
ERYTHROCYTE [DISTWIDTH] IN BLOOD BY AUTOMATED COUNT: 13.3 % (ref 11.6–15.1)
GFR SERPL CREATININE-BSD FRML MDRD: 112 ML/MIN/1.73SQ M
GLUCOSE SERPL-MCNC: 96 MG/DL (ref 65–140)
HCT VFR BLD AUTO: 47.2 % (ref 36.5–49.3)
HGB BLD-MCNC: 15.3 G/DL (ref 12–17)
IMM GRANULOCYTES # BLD AUTO: 0.01 THOUSAND/UL (ref 0–0.2)
IMM GRANULOCYTES NFR BLD AUTO: 0 % (ref 0–2)
LYMPHOCYTES # BLD AUTO: 2.67 THOUSANDS/ΜL (ref 0.6–4.47)
LYMPHOCYTES NFR BLD AUTO: 36 % (ref 14–44)
MCH RBC QN AUTO: 26.1 PG (ref 26.8–34.3)
MCHC RBC AUTO-ENTMCNC: 32.4 G/DL (ref 31.4–37.4)
MCV RBC AUTO: 80 FL (ref 82–98)
MONOCYTES # BLD AUTO: 0.64 THOUSAND/ΜL (ref 0.17–1.22)
MONOCYTES NFR BLD AUTO: 9 % (ref 4–12)
NEUTROPHILS # BLD AUTO: 3.99 THOUSANDS/ΜL (ref 1.85–7.62)
NEUTS SEG NFR BLD AUTO: 53 % (ref 43–75)
NRBC BLD AUTO-RTO: 0 /100 WBCS
PLATELET # BLD AUTO: 246 THOUSANDS/UL (ref 149–390)
PMV BLD AUTO: 10 FL (ref 8.9–12.7)
POTASSIUM SERPL-SCNC: 3.7 MMOL/L (ref 3.5–5.3)
RBC # BLD AUTO: 5.87 MILLION/UL (ref 3.88–5.62)
SODIUM SERPL-SCNC: 138 MMOL/L (ref 136–145)
TSH SERPL DL<=0.05 MIU/L-ACNC: 1.91 UIU/ML (ref 0.36–3.74)
WBC # BLD AUTO: 7.41 THOUSAND/UL (ref 4.31–10.16)

## 2021-04-28 PROCEDURE — 85025 COMPLETE CBC W/AUTO DIFF WBC: CPT | Performed by: EMERGENCY MEDICINE

## 2021-04-28 PROCEDURE — 84443 ASSAY THYROID STIM HORMONE: CPT | Performed by: EMERGENCY MEDICINE

## 2021-04-28 PROCEDURE — 80048 BASIC METABOLIC PNL TOTAL CA: CPT | Performed by: EMERGENCY MEDICINE

## 2021-04-28 PROCEDURE — 86308 HETEROPHILE ANTIBODY SCREEN: CPT | Performed by: EMERGENCY MEDICINE

## 2021-04-28 PROCEDURE — 86618 LYME DISEASE ANTIBODY: CPT | Performed by: EMERGENCY MEDICINE

## 2021-04-28 PROCEDURE — 36415 COLL VENOUS BLD VENIPUNCTURE: CPT | Performed by: EMERGENCY MEDICINE

## 2021-04-28 PROCEDURE — 99283 EMERGENCY DEPT VISIT LOW MDM: CPT

## 2021-04-28 PROCEDURE — 99284 EMERGENCY DEPT VISIT MOD MDM: CPT | Performed by: EMERGENCY MEDICINE

## 2021-04-28 RX ORDER — IBUPROFEN 600 MG/1
600 TABLET ORAL ONCE
Status: COMPLETED | OUTPATIENT
Start: 2021-04-28 | End: 2021-04-28

## 2021-04-28 RX ADMIN — IBUPROFEN 600 MG: 600 TABLET, FILM COATED ORAL at 21:31

## 2021-04-29 LAB — HETEROPH AB SER QL: NEGATIVE

## 2021-04-29 NOTE — ED PROVIDER NOTES
History  Chief Complaint   Patient presents with    Pain     "every single day my body hurts", "all of my body", "i want to check everything because its not normal", able to ambulate, laughing in triage, appears in no distress     23 yo male presents to ED asking for "everything to be checked", explaining that he has not felt normal for at least 1 month, characterized by feeling tired every day, and keeps having discomfort he localizes to his chest and says it is "pulsing", but also c/o body pain from head to toe, without fever, chills  He affirms nausea, no vomiting, no diarrhea  He denies any sick contacts  He has been to EDs twice in the interval since he describeds onset of these symptoms for somatic complaints  ED screening exams to date have been reassuring  Today he said he wants me to check his "veins", and points to neck veins and arm veins  He is otherwise in no distress, and has a normal physical exam and normal vital signs  I explained the ED goal to screen for acute medical conditions requiring emergent intervention, and I can check for some chronic illnesses common to his age and environment, namely Lyme or Pratt  He continued to perseverate on checking "everything" so I explained that there is not a corresponding workup that will meet his expectation  History provided by:  Patient      Prior to Admission Medications   Prescriptions Last Dose Informant Patient Reported? Taking?   naproxen (NAPROSYN) 500 mg tablet   No No   Sig: Take 1 tablet (500 mg total) by mouth every 12 (twelve) hours as needed for mild pain or moderate pain      Facility-Administered Medications: None       History reviewed  No pertinent past medical history  History reviewed  No pertinent surgical history  History reviewed  No pertinent family history  I have reviewed and agree with the history as documented      E-Cigarette/Vaping     E-Cigarette/Vaping Substances     Social History     Tobacco Use    Smoking status: Never Smoker    Smokeless tobacco: Never Used   Substance Use Topics    Alcohol use: Yes    Drug use: Yes       Review of Systems   Constitutional: Negative for appetite change, chills and fever  HENT: Negative for sore throat  Respiratory: Negative for cough, shortness of breath and wheezing  Cardiovascular: Negative for chest pain and palpitations  Gastrointestinal: Negative for abdominal pain, diarrhea, nausea and vomiting  Genitourinary: Negative for dysuria and hematuria  Musculoskeletal: Negative for neck pain  Skin: Negative for rash  Neurological: Negative for dizziness, weakness and headaches  Psychiatric/Behavioral: Negative for suicidal ideas  All other systems reviewed and are negative  Physical Exam  Physical Exam  Vitals signs and nursing note reviewed  Constitutional:       Appearance: Normal appearance  He is well-developed  He is not toxic-appearing or diaphoretic  HENT:      Head: Normocephalic  Right Ear: Tympanic membrane and external ear normal       Left Ear: External ear normal       Nose: Nose normal    Eyes:      General: Lids are normal       Conjunctiva/sclera: Conjunctivae normal       Pupils: Pupils are equal, round, and reactive to light  Neck:      Musculoskeletal: Normal range of motion and neck supple  Vascular: No JVD  Meningeal: Brudzinski's sign and Kernig's sign absent  Cardiovascular:      Rate and Rhythm: Normal rate and regular rhythm  Heart sounds: Normal heart sounds  No murmur  Pulmonary:      Effort: Pulmonary effort is normal  No tachypnea, accessory muscle usage or respiratory distress  Breath sounds: Normal breath sounds  No wheezing  Abdominal:      General: Bowel sounds are normal  There is no distension  Palpations: Abdomen is soft  Abdomen is not rigid  There is no mass  Tenderness: There is no abdominal tenderness  There is no guarding or rebound     Musculoskeletal: Normal range of motion  Lymphadenopathy:      Head:      Right side of head: No submental, submandibular, preauricular or posterior auricular adenopathy  Left side of head: No submental, submandibular, preauricular or posterior auricular adenopathy  Cervical: No cervical adenopathy  Skin:     General: Skin is warm and dry  Findings: No rash  Neurological:      Mental Status: He is alert and oriented to person, place, and time  GCS: GCS eye subscore is 4  GCS verbal subscore is 5  GCS motor subscore is 6  Cranial Nerves: No cranial nerve deficit  Sensory: No sensory deficit  Coordination: Coordination normal       Deep Tendon Reflexes: Reflexes are normal and symmetric  Psychiatric:         Speech: Speech normal          Behavior: Behavior normal          Thought Content: Thought content normal          Vital Signs  ED Triage Vitals [04/28/21 1936]   Temperature Pulse Respirations Blood Pressure SpO2   98 1 °F (36 7 °C) 68 18 154/71 99 %      Temp src Heart Rate Source Patient Position - Orthostatic VS BP Location FiO2 (%)   -- Monitor -- -- --      Pain Score       No Pain           Vitals:    04/28/21 1936   BP: 154/71   Pulse: 68         Visual Acuity      ED Medications  Medications   ibuprofen (MOTRIN) tablet 600 mg (600 mg Oral Given 4/28/21 2131)       Diagnostic Studies  Results Reviewed     Procedure Component Value Units Date/Time    TSH [[id number]]  (Normal) Collected: 04/28/21 2131    Lab Status: Final result Specimen: Blood from Arm, Right Updated: 04/28/21 2201     TSH 3RD GENERATON 1 910 uIU/mL     Narrative:      Patients undergoing fluorescein dye angiography may retain small amounts of fluorescein in the body for 48-72 hours post procedure  Samples containing fluorescein can produce falsely depressed TSH values  If the patient had this procedure,a specimen should be resubmitted post fluorescein clearance        Basic metabolic panel [[id number]] Collected: 04/28/21 2131 Lab Status: Final result Specimen: Blood from Arm, Right Updated: 04/28/21 2201     Sodium 138 mmol/L      Potassium 3 7 mmol/L      Chloride 102 mmol/L      CO2 27 mmol/L      ANION GAP 9 mmol/L      BUN 16 mg/dL      Creatinine 0 96 mg/dL      Glucose 96 mg/dL      Calcium 9 2 mg/dL      eGFR 112 ml/min/1 73sq m     Narrative:      [place] guidelines for Chronic Kidney Disease (CKD):     Stage 1 with normal or high GFR (GFR > 90 mL/min/1 73 square meters)    Stage 2 Mild CKD (GFR = 60-89 mL/min/1 73 square meters)    Stage 3A Moderate CKD (GFR = 45-59 mL/min/1 73 square meters)    Stage 3B Moderate CKD (GFR = 30-44 mL/min/1 73 square meters)    Stage 4 Severe CKD (GFR = 15-29 mL/min/1 73 square meters)    Stage 5 End Stage CKD (GFR <15 mL/min/1 73 square meters)  Note: GFR calculation is accurate only with a steady state creatinine    CBC and differential [[id number]]  (Abnormal) Collected: 04/28/21 2131    Lab Status: Final result Specimen: Blood from Arm, Right Updated: 04/28/21 2136     WBC 7 41 Thousand/uL      RBC 5 87 Million/uL      Hemoglobin 15 3 g/dL      Hematocrit 47 2 %      MCV 80 fL      MCH 26 1 pg      MCHC 32 4 g/dL      RDW 13 3 %      MPV 10 0 fL      Platelets 203 Thousands/uL      nRBC 0 /100 WBCs      Neutrophils Relative 53 %      Immat GRANS % 0 %      Lymphocytes Relative 36 %      Monocytes Relative 9 %      Eosinophils Relative 1 %      Basophils Relative 1 %      Neutrophils Absolute 3 99 Thousands/µL      Immature Grans Absolute 0 01 Thousand/uL      Lymphocytes Absolute 2 67 Thousands/µL      Monocytes Absolute 0 64 Thousand/µL      Eosinophils Absolute 0 05 Thousand/µL      Basophils Absolute 0 05 Thousands/µL     Lyme Antibody Profile with reflex to [employer] [[id number]] Collected: 04/28/21 2131    Lab Status: In process Specimen: Blood from Arm, Right Updated: 04/28/21 2134    Mononucleosis screen [[id number]] Collected: 04/28/21 2131    Lab Status:  In process Specimen: Blood from Arm, Right Updated: 04/28/21 2134                 No orders to display              Procedures  Procedures         ED Course                             SBIRT 22yo+      Most Recent Value   SBIRT (23 yo +)   In order to provide better care to our patients, we are screening all of our patients for alcohol and drug use  Would it be okay to ask you these screening questions? No Filed at: 04/28/2021 2037                    MDM    Disposition  Final diagnoses:   Normal physical exam   Generalized pain     Time reflects when diagnosis was documented in both MDM as applicable and the Disposition within this note     Time User Action Codes Description Comment    4/28/2021 10:09 PM Florez Boehringer Add [R53 83] Fatigue     4/28/2021 10:09 PM Gene Contes L Add [Z00 00] Normal physical exam     4/28/2021 10:09 PM Florez Boehringer Modify [Z00 00] Normal physical exam     4/28/2021 10:09 PM Florez Boehringer Remove [R53 83] Fatigue     4/28/2021 10:10 PM Florez Boehringer Add [R53 83] Fatigue     4/28/2021 10:10 PM [place] [R53 83] Fatigue     4/28/2021 10:10 PM Florez Boehringer Add [R52] Generalized pain       ED Disposition     ED Disposition Condition Date/Time Comment    Discharge Good Wed Apr 28, 2021 10:09 PM Jeffrey Smith discharge to home/self care  Follow-up Information     Follow up With Specialties Details Why Contact Info    [employer]  Call  For followup information with primary care [phone]            Discharge Medication List as of 4/28/2021 10:13 PM      CONTINUE these medications which have NOT CHANGED    Details   naproxen (NAPROSYN) 500 mg tablet Take 1 tablet (500 mg total) by mouth every 12 (twelve) hours as needed for mild pain or moderate pain, Starting Thu 4/15/2021, Print           No discharge procedures on file      PDMP Review     None          ED Provider  Electronically Signed by           Helen Joe MD  04/28/21 [address]

## 2021-04-29 NOTE — DISCHARGE INSTRUCTIONS
Tu examen es normal    Los análisis de bismark son FRUFÄLLAN  Envié ny prueba de la enfermedad de Lyme y [place] prueba del monovirus  Esos resultados estarán disponibles en unos gris  Debe hacer un seguimiento con un médico de atención primaria si continúa experimentando síntomas  Puede regresar al servicio de urgencias por problemas de emergencia

## 2021-04-30 LAB — B BURGDOR IGG+IGM SER-ACNC: 29

## 2021-05-01 ENCOUNTER — HOSPITAL ENCOUNTER (EMERGENCY)
Facility: HOSPITAL | Age: 21
Discharge: HOME | End: 2021-05-01
Attending: EMERGENCY MEDICINE
Payer: COMMERCIAL

## 2021-05-01 ENCOUNTER — APPOINTMENT (EMERGENCY)
Dept: RADIOLOGY | Facility: HOSPITAL | Age: 21
End: 2021-05-01
Payer: COMMERCIAL

## 2021-05-01 VITALS
OXYGEN SATURATION: 100 % | RESPIRATION RATE: 18 BRPM | TEMPERATURE: 97.1 F | SYSTOLIC BLOOD PRESSURE: 138 MMHG | DIASTOLIC BLOOD PRESSURE: 83 MMHG | HEART RATE: 95 BPM

## 2021-05-01 DIAGNOSIS — R07.9 RECURRENT CHEST PAIN: ICD-10-CM

## 2021-05-01 DIAGNOSIS — R07.89 CHEST WALL PAIN: Primary | ICD-10-CM

## 2021-05-01 LAB
ANION GAP SERPL CALCULATED.3IONS-SCNC: 10 MMOL/L (ref 5–14)
ATRIAL RATE: 81 BPM
BASOPHILS # BLD AUTO: 0.1 THOUSANDS/ΜL (ref 0–0.1)
BASOPHILS NFR BLD AUTO: 1 % (ref 0–1)
BUN SERPL-MCNC: 15 MG/DL (ref 5–25)
CALCIUM SERPL-MCNC: 10.3 MG/DL (ref 8.4–10.2)
CHLORIDE SERPL-SCNC: 103 MMOL/L (ref 97–108)
CO2 SERPL-SCNC: 28 MMOL/L (ref 22–30)
CREAT SERPL-MCNC: 0.99 MG/DL (ref 0.7–1.5)
EOSINOPHIL # BLD AUTO: 0 THOUSAND/ΜL (ref 0–0.4)
EOSINOPHIL NFR BLD AUTO: 0 % (ref 0–6)
ERYTHROCYTE [DISTWIDTH] IN BLOOD BY AUTOMATED COUNT: 13.9 %
GFR SERPL CREATININE-BSD FRML MDRD: 108 ML/MIN/1.73SQ M
GLUCOSE SERPL-MCNC: 96 MG/DL (ref 70–99)
HCT VFR BLD AUTO: 46.2 % (ref 41–53)
HGB BLD-MCNC: 15.2 G/DL (ref 13.5–17.5)
LYMPHOCYTES # BLD AUTO: 2.1 THOUSANDS/ΜL (ref 0.5–4)
LYMPHOCYTES NFR BLD AUTO: 22 % (ref 25–45)
MCH RBC QN AUTO: 26 PG (ref 26–34)
MCHC RBC AUTO-ENTMCNC: 32.9 G/DL (ref 31–36)
MCV RBC AUTO: 79 FL (ref 80–100)
MONOCYTES # BLD AUTO: 0.5 THOUSAND/ΜL (ref 0.2–0.9)
MONOCYTES NFR BLD AUTO: 6 % (ref 1–10)
NEUTROPHILS # BLD AUTO: 6.5 THOUSANDS/ΜL (ref 1.8–7.8)
NEUTS SEG NFR BLD AUTO: 71 % (ref 45–65)
P AXIS: 56 DEGREES
PLATELET # BLD AUTO: 243 THOUSANDS/UL (ref 150–450)
PLATELET BLD QL SMEAR: ADEQUATE
PMV BLD AUTO: 8.6 FL (ref 8.9–12.7)
POTASSIUM SERPL-SCNC: 3.5 MMOL/L (ref 3.6–5)
PR INTERVAL: 158 MS
QRS AXIS: 40 DEGREES
QRSD INTERVAL: 84 MS
QT INTERVAL: 362 MS
QTC INTERVAL: 420 MS
RBC # BLD AUTO: 5.83 MILLION/UL (ref 4.5–5.9)
RBC MORPH BLD: NORMAL
SODIUM SERPL-SCNC: 141 MMOL/L (ref 137–147)
T WAVE AXIS: 20 DEGREES
TROPONIN I SERPL-MCNC: <0.01 NG/ML (ref 0–0.03)
VENTRICULAR RATE: 81 BPM
WBC # BLD AUTO: 9.2 THOUSAND/UL (ref 4.5–11)

## 2021-05-01 PROCEDURE — 36415 COLL VENOUS BLD VENIPUNCTURE: CPT | Performed by: PHYSICIAN ASSISTANT

## 2021-05-01 PROCEDURE — 84484 ASSAY OF TROPONIN QUANT: CPT | Performed by: PHYSICIAN ASSISTANT

## 2021-05-01 PROCEDURE — 93005 ELECTROCARDIOGRAM TRACING: CPT

## 2021-05-01 PROCEDURE — 85025 COMPLETE CBC W/AUTO DIFF WBC: CPT | Performed by: PHYSICIAN ASSISTANT

## 2021-05-01 PROCEDURE — 99285 EMERGENCY DEPT VISIT HI MDM: CPT

## 2021-05-01 PROCEDURE — 99285 EMERGENCY DEPT VISIT HI MDM: CPT | Performed by: PHYSICIAN ASSISTANT

## 2021-05-01 PROCEDURE — 80048 BASIC METABOLIC PNL TOTAL CA: CPT | Performed by: PHYSICIAN ASSISTANT

## 2021-05-01 PROCEDURE — 93010 ELECTROCARDIOGRAM REPORT: CPT | Performed by: INTERNAL MEDICINE

## 2021-05-01 PROCEDURE — 71045 X-RAY EXAM CHEST 1 VIEW: CPT

## 2021-05-01 NOTE — ED PROVIDER NOTES
History  Chief Complaint   Patient presents with    Chest Pain     started today 20 min pta, mid sternum, radiating to the dright shoulder/ neck    Shortness of Breath     feels tights and travels to the back of chest       18-year-old male presents to the ED for evaluation of chest pain shortness of breath that began 20 minutes prior to arrival   Patient was playing softball at onset of symptoms  Patient reports that pain is noted mid sternum and radiates to the right side as well as to the shoulder in back  Pain worsened with deep breathing  Symptoms mildly associated with shortness of breath  Mild improvement in symptoms since onset  Symptoms not associated with any diaphoresis, nausea, vomiting, abdominal pain  Patient reports similar symptoms in the past however states he normally does not have chest pain on the right side of his chest he does feel today  Patient was previously evaluated in the ED 3 days ago with a normal physical exam   And workup  Denies use of any medications for symptoms  Denies any injury or trauma  History provided by:  Patient   used: No        Prior to Admission Medications   Prescriptions Last Dose Informant Patient Reported? Taking?   naproxen (NAPROSYN) 500 mg tablet Not Taking at Unknown time  No No   Sig: Take 1 tablet (500 mg total) by mouth every 12 (twelve) hours as needed for mild pain or moderate pain   Patient not taking: Reported on 5/1/2021      Facility-Administered Medications: None       History reviewed  No pertinent past medical history  History reviewed  No pertinent surgical history  History reviewed  No pertinent family history  I have reviewed and agree with the history as documented      E-Cigarette/Vaping     E-Cigarette/Vaping Substances     Social History     Tobacco Use    Smoking status: Never Smoker    Smokeless tobacco: Never Used   Substance Use Topics    Alcohol use: Yes     Comment: occasional    Drug use: Not Currently       Review of Systems   Constitutional: Negative for chills, diaphoresis, fatigue and fever  HENT: Negative for congestion, rhinorrhea and sore throat  Eyes: Negative for photophobia and visual disturbance  Respiratory: Positive for shortness of breath  Negative for cough and wheezing  Cardiovascular: Positive for chest pain  Negative for palpitations  Gastrointestinal: Negative for abdominal pain, constipation, diarrhea, nausea and vomiting  Genitourinary: Negative for difficulty urinating, dysuria and hematuria  Musculoskeletal: Negative for back pain, myalgias and neck pain  Skin: Negative for color change, pallor and rash  Allergic/Immunologic: Negative for immunocompromised state  Neurological: Negative for dizziness, tremors, syncope, weakness, light-headedness, numbness and headaches  Hematological: Negative for adenopathy  Does not bruise/bleed easily  Psychiatric/Behavioral: Negative for behavioral problems  Physical Exam  Physical Exam  Vitals signs and nursing note reviewed  Constitutional:       General: He is not in acute distress  Appearance: He is well-developed  He is obese  He is not ill-appearing, toxic-appearing or diaphoretic  HENT:      Head: Normocephalic and atraumatic  Right Ear: External ear normal       Left Ear: External ear normal       Nose: Nose normal       Mouth/Throat:      Pharynx: No oropharyngeal exudate  Eyes:      General: No scleral icterus  Right eye: No discharge  Left eye: No discharge  Conjunctiva/sclera: Conjunctivae normal       Pupils: Pupils are equal, round, and reactive to light  Neck:      Musculoskeletal: Normal range of motion and neck supple  Trachea: No tracheal deviation  Cardiovascular:      Rate and Rhythm: Normal rate and regular rhythm  Pulses:           Radial pulses are 2+ on the right side and 2+ on the left side  Heart sounds: Normal heart sounds   No murmur  Pulmonary:      Effort: Pulmonary effort is normal  No respiratory distress  Breath sounds: Normal breath sounds  No wheezing, rhonchi or rales  Chest:      Chest wall: No mass, deformity, tenderness, crepitus or edema  Abdominal:      General: Bowel sounds are normal  There is no distension  Palpations: Abdomen is soft  There is no mass  Tenderness: There is no abdominal tenderness  There is no guarding or rebound  Musculoskeletal: Normal range of motion  General: No deformity  Right lower leg: No edema  Left lower leg: No edema  Lymphadenopathy:      Cervical: No cervical adenopathy  Skin:     General: Skin is warm and dry  Capillary Refill: Capillary refill takes less than 2 seconds  Coloration: Skin is not cyanotic or pale  Neurological:      Mental Status: He is alert and oriented to person, place, and time  Sensory: No sensory deficit  Psychiatric:         Mood and Affect: Mood is anxious           Behavior: Behavior normal          Vital Signs  ED Triage Vitals [05/01/21 1805]   Temperature Pulse Respirations Blood Pressure SpO2   (!) 50 2 °F (10 1 °C) 95 18 138/83 100 %      Temp Source Heart Rate Source Patient Position - Orthostatic VS BP Location FiO2 (%)   Tympanic -- -- Left arm --      Pain Score       6           Vitals:    05/01/21 1805   BP: 138/83   Pulse: 95         Visual Acuity      ED Medications  Medications - No data to display    Diagnostic Studies  Results Reviewed     Procedure Component Value Units Date/Time    Troponin I [[id number]]  (Normal) Collected: 05/01/21 1834    Lab Status: Final result Specimen: Blood from Arm, Right Updated: 05/01/21 1907     Troponin I <0 01 ng/mL     Basic metabolic panel [[id number]]  (Abnormal) Collected: 05/01/21 1834    Lab Status: Final result Specimen: Blood from Arm, Right Updated: 05/01/21 1857     Sodium 141 mmol/L      Potassium 3 5 mmol/L      Chloride 103 mmol/L      CO2 28 mmol/L      ANION GAP 10 mmol/L      BUN 15 mg/dL      Creatinine 0 99 mg/dL      Glucose 96 mg/dL      Calcium 10 3 mg/dL      eGFR 108 ml/min/1 73sq m     Narrative:      [place] guidelines for Chronic Kidney Disease (CKD):     Stage 1 with normal or high GFR (GFR > 90 mL/min/1 73 square meters)    Stage 2 Mild CKD (GFR = 60-89 mL/min/1 73 square meters)    Stage 3A Moderate CKD (GFR = 45-59 mL/min/1 73 square meters)    Stage 3B Moderate CKD (GFR = 30-44 mL/min/1 73 square meters)    Stage 4 Severe CKD (GFR = 15-29 mL/min/1 73 square meters)    Stage 5 End Stage CKD (GFR <15 mL/min/1 73 square meters)  Note: GFR calculation is accurate only with a steady state creatinine    Smear Review(Phlebs Do Not Order) [[id number]] Collected: 05/01/21 1834    Lab Status: Final result Specimen: Blood from Arm, Right Updated: 05/01/21 1849     RBC Morphology Normal     Platelet Estimate Adequate    CBC and differential [[id number]]  (Abnormal) Collected: 05/01/21 1834    Lab Status: Final result Specimen: Blood from Arm, Right Updated: 05/01/21 1848     WBC 9 20 Thousand/uL      RBC 5 83 Million/uL      Hemoglobin 15 2 g/dL      Hematocrit 46 2 %      MCV 79 fL      MCH 26 0 pg      MCHC 32 9 g/dL      RDW 13 9 %      MPV 8 6 fL      Platelets 753 Thousands/uL      Neutrophils Relative 71 %      Lymphocytes Relative 22 %      Monocytes Relative 6 %      Eosinophils Relative 0 %      Basophils Relative 1 %      Neutrophils Absolute 6 50 Thousands/µL      Lymphocytes Absolute 2 10 Thousands/µL      Monocytes Absolute 0 50 Thousand/µL      Eosinophils Absolute 0 00 Thousand/µL      Basophils Absolute 0 10 Thousands/µL                  XR chest 1 view portable   ED Interpretation by Ashley Washington PA-C (05/01 1906)   No acute cardiopulmonary disease  Final Result by Rashad Garcia MD (05/02 6732)      No acute cardiopulmonary disease                    Workstation performed: [id number] Procedures  Procedures         ED Course  ED Course as of May 06 2216   Sat May 01, 2021   1828 98 2 per nursing     Temperature(!): 50 2 °F (10 1 °C)             HEART Risk Score      Most Recent Value   Heart Score Risk Calculator   History  0 Filed at: 05/01/2021 1910   ECG  1 Filed at: 05/01/2021 1910   Age  0 Filed at: 05/01/2021 1910   Risk Factors  1 Filed at: 05/01/2021 1910   Troponin  0 Filed at: 05/01/2021 1910   HEART Score  2 Filed at: 05/01/2021 1910                      SBIRT 22yo+      Most Recent Value   SBIRT (25 yo +)   In order to provide better care to our patients, we are screening all of our patients for alcohol and drug use  Would it be okay to ask you these screening questions? No Filed at: 05/01/2021 1835   Initial Alcohol Screen: US AUDIT-C    1  How often do you have a drink containing alcohol?  0 Filed at: 05/01/2021 1835   2  How many drinks containing alcohol do you have on a typical day you are drinking? 0 Filed at: 05/01/2021 1835   3a  Male UNDER 65: How often do you have five or more drinks on one occasion? 0 Filed at: 05/01/2021 1835   Audit-C Score  0 Filed at: 05/01/2021 1835   PRERNA: How many times in the past year have you    Used an illegal drug or used a prescription medication for non-medical reasons? Never Filed at: 05/01/2021 1835                    MDM  Number of Diagnoses or Management Options  Chest wall pain: new and requires workup  Recurrent chest pain: new and requires workup  Diagnosis management comments: 50-year-old male presents to the ED for evaluation of chest pain had occurred while playing softball  Chest pain is worse with deep breathing  Patient in no acute distress on exam   Reports he had similar chest pain in the past   Vital signs stable  EKG normal sinus rhythm  Troponin normal   Other labs noncontributory  Normal chest x-ray  Heart score 2  Patient presented in anxious mood    Discussed anxiety as possible cause of chest pain   Patient later reported improvement of symptoms with ED observation  Advised to follow-up with PCP  Stable at discharge  Low suspicion for major cardiac event at this time  Amount and/or Complexity of Data Reviewed  Clinical lab tests: ordered and reviewed  Tests in the radiology section of CPT®: ordered and reviewed  Review and summarize past medical records: yes  Discuss the patient with other providers: yes  Independent visualization of images, tracings, or specimens: yes    Risk of Complications, Morbidity, and/or Mortality  Presenting problems: moderate  Diagnostic procedures: moderate  Management options: moderate    Patient Progress  Patient progress: stable      Disposition  Final diagnoses:   Chest wall pain   Recurrent chest pain     Time reflects when diagnosis was documented in both MDM as applicable and the Disposition within this note     Time User Action Codes Description Comment    5/1/2021  6:26 PM Susan Charles Add [R07 89] Chest wall pain     5/1/2021  6:27 PM Susan Charles Add [R07 9] Recurrent chest pain       ED Disposition     ED Disposition Condition Date/Time Comment    Discharge Stable Sat May 1, 2021  6:26 PM Tramaine Allen discharge to home/self care              Follow-up Information     Follow up With Specialties Details Why Contact Info Additional Information    [address] [facility] Cardiology   [facility] [address]  [address] [place] [address] Cardiology [address], [address] [phone]          Discharge Medication List as of 5/1/2021  6:27 PM      CONTINUE these medications which have NOT CHANGED    Details   naproxen (NAPROSYN) 500 mg tablet Take 1 tablet (500 mg total) by mouth every 12 (twelve) hours as needed for mild pain or moderate pain, Starting Thu 4/15/2021, Print               PDMP Review     None          ED Provider  Electronically Signed by           Jami Carter Donell Burnett PA-C  05/06/21 0134

## 2021-05-03 ENCOUNTER — APPOINTMENT (EMERGENCY)
Dept: CT IMAGING | Facility: HOSPITAL | Age: 21
End: 2021-05-03
Payer: COMMERCIAL

## 2021-05-03 ENCOUNTER — HOSPITAL ENCOUNTER (EMERGENCY)
Facility: HOSPITAL | Age: 21
Discharge: HOME | End: 2021-05-04
Attending: EMERGENCY MEDICINE | Admitting: EMERGENCY MEDICINE
Payer: COMMERCIAL

## 2021-05-03 VITALS
OXYGEN SATURATION: 100 % | TEMPERATURE: 97.9 F | WEIGHT: 198 LBS | HEART RATE: 62 BPM | RESPIRATION RATE: 16 BRPM | DIASTOLIC BLOOD PRESSURE: 86 MMHG | SYSTOLIC BLOOD PRESSURE: 149 MMHG

## 2021-05-03 DIAGNOSIS — J02.0 STREP PHARYNGITIS: Primary | ICD-10-CM

## 2021-05-03 LAB — S PYO DNA THROAT QL NAA+PROBE: DETECTED

## 2021-05-03 PROCEDURE — 87651 STREP A DNA AMP PROBE: CPT | Performed by: PHYSICIAN ASSISTANT

## 2021-05-03 PROCEDURE — 70490 CT SOFT TISSUE NECK W/O DYE: CPT

## 2021-05-03 PROCEDURE — 99283 EMERGENCY DEPT VISIT LOW MDM: CPT

## 2021-05-04 PROCEDURE — 96372 THER/PROPH/DIAG INJ SC/IM: CPT

## 2021-05-04 PROCEDURE — 99284 EMERGENCY DEPT VISIT MOD MDM: CPT | Performed by: PHYSICIAN ASSISTANT

## 2021-05-04 RX ADMIN — PENICILLIN G BENZATHINE 1.2 MILLION UNITS: 1200000 INJECTION, SUSPENSION INTRAMUSCULAR at 00:21

## 2021-05-04 NOTE — DISCHARGE INSTRUCTIONS
Your strep throat  You are still contagious for 5-7 days  Return for worsening complaints  Follow-up the primary care doctor

## 2021-05-04 NOTE — ED ATTENDING ATTESTATION
I was the attending physician on duty at the time the patient visited the emergency department  The patient was evaluated and dispositioned by the APC  I was personally available for consultation  I am administratively signing the chart after the fact      David Castillo MD

## 2021-05-04 NOTE — ED PROVIDER NOTES
History  Chief Complaint   Patient presents with    Sore Throat     Pt states his throat hurts, and SOB  Pt says he was seen 4 times this week for similar complaints  71-year-old male without significant past medical history presents complaining sore throat and neck swelling for the past 10 days  Patient was seen for this 3 times before  however tells me that in prior visits his pain was more in the chest and not throat  Denies fevers  Admits to pain with swallowing  He tolerating normal p o  Intake  Denies change in voice  Tolerating secretions  Denies any other complaints  History provided by:  Patient   used: No    Sore Throat  Location:  Posterior  Quality:  Sore  Severity:  Moderate  Onset quality:  Gradual  Timing:  Constant  Progression:  Waxing and waning  Associated symptoms: no abdominal pain, no chest pain, no chills, no cough, no ear pain, no neck stiffness, no rash and no shortness of breath        Prior to Admission Medications   Prescriptions Last Dose Informant Patient Reported? Taking?   naproxen (NAPROSYN) 500 mg tablet   No No   Sig: Take 1 tablet (500 mg total) by mouth every 12 (twelve) hours as needed for mild pain or moderate pain   Patient not taking: Reported on 5/1/2021      Facility-Administered Medications: None       History reviewed  No pertinent past medical history  History reviewed  No pertinent surgical history  History reviewed  No pertinent family history  I have reviewed and agree with the history as documented  E-Cigarette/Vaping     E-Cigarette/Vaping Substances     Social History     Tobacco Use    Smoking status: Never Smoker    Smokeless tobacco: Never Used   Substance Use Topics    Alcohol use: Yes     Comment: occasional    Drug use: Not Currently       Review of Systems   Constitutional: Negative  Negative for chills and fatigue  HENT: Positive for sore throat  Negative for ear pain      Eyes: Negative for photophobia and redness  Respiratory: Negative for apnea, cough and shortness of breath  Cardiovascular: Negative for chest pain  Gastrointestinal: Negative for abdominal pain, nausea and vomiting  Genitourinary: Negative for dysuria  Musculoskeletal: Negative for arthralgias, neck pain and neck stiffness  Skin: Negative for rash  Neurological: Negative for dizziness, tremors, syncope and weakness  Psychiatric/Behavioral: Negative for suicidal ideas  Physical Exam  Physical Exam  Constitutional:       General: He is not in acute distress  Appearance: He is well-developed  He is not diaphoretic  HENT:      Mouth/Throat:      Comments: Mild posterior oropharynx erythema with point +tonsillitis no exudates  Midline uvula  Eyes:      Pupils: Pupils are equal, round, and reactive to light  Neck:      Musculoskeletal: Normal range of motion and neck supple  Cardiovascular:      Rate and Rhythm: Normal rate and regular rhythm  Pulmonary:      Effort: Pulmonary effort is normal  No respiratory distress  Breath sounds: Normal breath sounds  Abdominal:      General: Bowel sounds are normal  There is no distension  Palpations: Abdomen is soft  Musculoskeletal: Normal range of motion  Skin:     General: Skin is warm and dry  Neurological:      Mental Status: He is alert and oriented to person, place, and time           Vital Signs  ED Triage Vitals [05/03/21 2155]   Temperature Pulse Respirations Blood Pressure SpO2   97 9 °F (36 6 °C) 62 16 149/86 100 %      Temp Source Heart Rate Source Patient Position - Orthostatic VS BP Location FiO2 (%)   Tympanic Monitor -- Left arm --      Pain Score       5           Vitals:    05/03/21 2155   BP: 149/86   Pulse: 62         Visual Acuity      ED Medications  Medications   penicillin G benzathine (BICILLIN L-A) intramuscular injection 1 2 Million Units (1 2 Million Units Intramuscular Given 5/4/21 0021)       Diagnostic Studies  Results Reviewed     Procedure Component Value Units Date/Time    Strep A PCR [[id number]]  (Abnormal) Collected: 05/03/21 2239    Lab Status: Final result Specimen: Throat Updated: 05/03/21 2317     STREP A PCR Detected                 CT soft tissue neck wo contrast   Final Result by Hitesh Jackson MD (05/03 2343)      No evidence of radiopaque foreign body, soft tissue inflammation or airway obstruction  Workstation performed: [id number]                    Procedures  Procedures         ED Course                             SBIRT 22yo+      Most Recent Value   SBIRT (22 yo +)   In order to provide better care to our patients, we are screening all of our patients for alcohol and drug use  Would it be okay to ask you these screening questions? Yes Filed at: 05/03/2021 2307   Initial Alcohol Screen: US AUDIT-C    1  How often do you have a drink containing alcohol?  0 Filed at: 05/03/2021 2307   2  How many drinks containing alcohol do you have on a typical day you are drinking? 0 Filed at: 05/03/2021 2307   3a  Male UNDER 65: How often do you have five or more drinks on one occasion? 0 Filed at: 05/03/2021 2307   Audit-C Score  0 Filed at: 05/03/2021 2307   PRERNA: How many times in the past year have you    Used an illegal drug or used a prescription medication for non-medical reasons? Never Filed at: 05/03/2021 2307                    MDM  Number of Diagnoses or Management Options  Strep pharyngitis: new and does not require workup  Diagnosis management comments: Patient had positive strep throat test in the emergency department today  Was treated with IM penicillin  Educated extensively on supportive care and return precautions, demonstrates understanding  Advised follow-up with his primary care doctor         Amount and/or Complexity of Data Reviewed  Clinical lab tests: ordered and reviewed  Tests in the radiology section of CPT®: ordered and reviewed    Risk of Complications, Morbidity, and/or Mortality  Presenting problems: moderate  Diagnostic procedures: moderate  Management options: moderate    Patient Progress  Patient progress: stable      Disposition  Final diagnoses:   Strep pharyngitis     Time reflects when diagnosis was documented in both MDM as applicable and the Disposition within this note     Time User Action Codes Description Comment    5/4/2021 12:29 AM Danielle Sexton Add [J02 0] Strep pharyngitis       ED Disposition     ED Disposition Condition Date/Time Comment    Discharge Stable Tue May 4, 2021 [address] discharge to home/self care  Follow-up Information     Follow up With Specialties Details Why Contact Info Additional [address] [facility] Emergency Department Emergency Medicine Call  As needed [address] [facility] [address] [address] [facility] Emergency Department          Patient's Medications   Discharge Prescriptions    No medications on file     No discharge procedures on file      PDMP Review     None          ED Provider  Electronically Signed by           Rashaad Smart PA-C  05/04/21 0154

## 2021-06-08 ENCOUNTER — CONSULT (OUTPATIENT)
Dept: CARDIOLOGY CLINIC | Facility: CLINIC | Age: 21
End: 2021-06-08
Payer: COMMERCIAL

## 2021-06-08 VITALS — DIASTOLIC BLOOD PRESSURE: 90 MMHG | WEIGHT: 193.2 LBS | HEART RATE: 70 BPM | SYSTOLIC BLOOD PRESSURE: 136 MMHG

## 2021-06-08 DIAGNOSIS — I30.0 IDIOPATHIC PERICARDITIS, UNSPECIFIED CHRONICITY: Primary | ICD-10-CM

## 2021-06-08 DIAGNOSIS — M94.0 COSTOCHONDRITIS: ICD-10-CM

## 2021-06-08 PROBLEM — I31.9 PERICARDITIS: Status: ACTIVE | Noted: 2021-06-08

## 2021-06-08 PROCEDURE — 99215 OFFICE O/P EST HI 40 MIN: CPT | Performed by: INTERNAL MEDICINE

## 2021-06-08 PROCEDURE — 93000 ELECTROCARDIOGRAM COMPLETE: CPT | Performed by: INTERNAL MEDICINE

## 2021-06-08 RX ORDER — IBUPROFEN 400 MG/1
400 TABLET ORAL EVERY 8 HOURS SCHEDULED
Qty: 45 TABLET | Refills: 0 | Status: SHIPPED | OUTPATIENT
Start: 2021-06-08 | End: 2021-06-08 | Stop reason: SDUPTHER

## 2021-06-08 RX ORDER — IBUPROFEN 400 MG/1
400 TABLET ORAL EVERY 8 HOURS SCHEDULED
Qty: 45 TABLET | Refills: 0 | Status: SHIPPED | OUTPATIENT
Start: 2021-06-08

## 2021-06-08 NOTE — PATIENT INSTRUCTIONS
CARDIOLOGY ASSESSMENT & PLAN:   Diagnosis ICD-10-CM Associated Orders   1  Idiopathic pericarditis, unspecified chronicity  I30 0 POCT ECG     ibuprofen (MOTRIN) 400 mg tablet   2  Costochondritis  M94 0 ibuprofen (MOTRIN) 400 mg tablet     Pericarditis  Patient is a 24 year young man with no significant risk factors who recently had upper respiratory tract symptoms and has been experiencing on an of substernal chest pain  Description of pain is more suggestive of pleuritic and costochondritis like pain  On examination he does not have a pericardial rub  He has no fevers or chills  -- I would like to give him a trial of ibuprofen 400 mg 3 times daily for total of 2 weeks to be taken with food  -- I am advising him that if symptoms are getting more frequent or worse over the next 2 weeks then he will need evaluation for possible pericarditis and pericardial effusion  He is advised to call us and let us know in that case  -- discussed with him warning signs which he should be watching for  These include but are not limited to: Increasing shortness of breath with activity, pain becoming worse, inability to lie in bed due to shortness of breath, decrease in exercise tolerance, swelling of lower extremities or any syncope or near-syncope

## 2021-06-08 NOTE — PROGRESS NOTES
[address] [place] [address], Nancy Longoria [address]  Phone#  [phone]  Fax#  [phone]  *-*-*-*-*-*-*-*-*-*-*-*-*-*-*-*-*-*-*-*-*-*-*-*-*-*-*-*-*-*-*-*-*-*-*-*-*-*-*-*-*-*-*-*-*-*-*-*-*-*-*-*-*-*  Linda Trung DATE: 06/08/21 5:31 PM  PATIENT NAME: Bernard Daly   2000    [id number]  Age: 24 y o  Sex: male  AUTHOR: Chad Castle MD  [facility] PHYSICIAN: No primary care provider on file  REFERRING PHYSICIAN: Referral [address] [place] [address],  [address] Self, Referral   *-*-*-*-*-*-*-*-*-*-*-*-*-*-*-*-*-*-*-*-*-*-*-*-*-*-*-*-*-*-*-*-*-*-*-*-*-*-*-*-*-*-*-*-*-*-*-*-*-*-*-*-*-*-  REASON FOR REFERRAL:  Evaluation of chest pain    *-*-*-*-*-*-*-*-*-*-*-*-*-*-*-*-*-*-*-*-*-*-*-*-*-*-*-*-*-*-*-*-*-*-*-*-*-*-*-*-*-*-*-*-*-*-*-*-*-*-*-*-*-*-  CARDIOLOGY ASSESSMENT & PLAN:   Diagnosis ICD-10-CM Associated Orders   1  Idiopathic pericarditis, unspecified chronicity  I30 0 POCT ECG     ibuprofen (MOTRIN) 400 mg tablet   2  Costochondritis  M94 0 ibuprofen (MOTRIN) 400 mg tablet     Pericarditis  Patient is a 24 year young man with no significant risk factors who recently had upper respiratory tract symptoms and has been experiencing on an of substernal chest pain  Description of pain is more suggestive of pleuritic and costochondritis like pain  On examination he does not have a pericardial rub  He has no fevers or chills  -- I would like to give him a trial of ibuprofen 400 mg 3 times daily for total of 2 weeks to be taken with food  -- I am advising him that if symptoms are getting more frequent or worse over the next 2 weeks then he will need evaluation for possible pericarditis and pericardial effusion  He is advised to call us and let us know in that case  -- discussed with him warning signs which he should be watching for  These include but are not limited to:   Increasing shortness of breath with activity, pain becoming worse, inability to lie in bed due to shortness of breath, decrease in exercise tolerance, swelling of lower extremities or any syncope or near-syncope  *-*-*-*-*-*-*-*-*-*-*-*-*-*-*-*-*-*-*-*-*-*-*-*-*-*-*-*-*-*-*-*-*-*-*-*-*-*-*-*-*-*-*-*-*-*-*-*-*-*-*-*-*-*-  CURRENT ECG:  Results for orders placed or performed in visit on 06/08/21   POCT ECG    Narrative    Normal sinus rhythm, HR 78 beats per minute, normal axis and intervals, no significant ST T-wave abnormalities  No significant chamber hypertrophy or enlargement  *-*-*-*-*-*-*-*-*-*-*-*-*-*-*-*-*-*-*-*-*-*-*-*-*-*-*-*-*-*-*-*-*-*-*-*-*-*-*-*-*-*-*-*-*-*-*-*-*-*-*-*-*-*-  HISTORY OF PRESENT ILLNESS:  Patient is a 24 year young man of [facility] origin who has been referred for evaluation of chest pain  He has no significant prior medical history  He has recently presented to [facility] in May 2021 with sore throat and some chest pain  His last ER visit was on 4th of May 2021  His evaluation was overall negative  He was diagnosed with pharyngitis  He was advised to establish follow-up with Cardiology  He reports that he his symptoms originally started last summer and since then on an of he gets burning substernal chest pain  Pain is worse when he stretches his arms and when he takes a deep breath  Occasionally he gets pain also while playing soccer  He denies any orthopnea, palpitation, presyncope/syncope  He denies any fevers chills, new rashes  Functional capacity status:  Good   (Excellent- >10 METs; Good: (7-10 METs); Moderate (4-7 METs); Poor (<= 4 METs)    Any chronic stressors:  None   (feeling of poor health, financial problems, and social isolation etc)  Tobacco or alcohol dependence:  He does not smoke cigarettes but does smoke Hookah 1 to 2 times a week  He drinks beer and rum and other alcohol products on weekends  Reports he does not drink too much    Denies any illicit drug use     *-*-*-*-*-*-*-*-*-*-*-*-*-*-*-*-*-*-*-*-*-*-*-*-*-*-*-*-*-*-*-*-*-*-*-*-*-*-*-*-*-*-*-*-*-*-*-*-*-*-*-*-*-*  PAST MEDICAL HISTORY:  No past medical history on file  PAST SURGICAL HISTORY:   No past surgical history on file  FAMILY HISTORY:  No family history on file  There is no family history of premature CAD or sudden cardiac death  There is no family history of heart failure or arrhythmias  SOCIAL HISTORY:  Social History     Tobacco Use   Smoking Status Never Smoker   Smokeless Tobacco Never Used      Social History     Substance and Sexual Activity   Alcohol Use Yes    Comment: occasional     Social History     Substance and Sexual Activity   Drug Use Not Currently    [unfilled]     *-*-*-*-*-*-*-*-*-*-*-*-*-*-*-*-*-*-*-*-*-*-*-*-*-*-*-*-*-*-*-*-*-*-*-*-*-*-*-*-*-*-*-*-*-*-*-*-*-*-*-*-*-*  ALLERGIES:  No Known Allergies CURRENT SCHEDULED MEDICATIONS:    Current Outpatient Medications:     ibuprofen (MOTRIN) 400 mg tablet, Take 1 tablet (400 mg total) by mouth every 8 (eight) hours, Disp: 45 tablet, Rfl: 0    naproxen (NAPROSYN) 500 mg tablet, Take 1 tablet (500 mg total) by mouth every 12 (twelve) hours as needed for mild pain or moderate pain (Patient not taking: Reported on 5/1/2021), Disp: 15 tablet, Rfl: 0     *-*-*-*-*-*-*-*-*-*-*-*-*-*-*-*-*-*-*-*-*-*-*-*-*-*-*-*-*-*-*-*-*-*-*-*-*-*-*-*-*-*-*-*-*-*-*-*-*-*-*-*-*-*  REVIEW OF SYMPTOMS:    Positive for:  As described above in HPI  Negative for: All remaining as reviewed below and in HPI   SYSTEM SYMPTOMS REVIEWED:  General--weight change, fever, night sweats  Respiratoryl-- Wheezing, shortness of breath, cough, URI symptoms, sputum, blood  Cardiovascular--chest pain, syncope, dyspnea on exertion, edema, decline in exercise tolerance, claudication   Gastrointestinal--persistent vomiting, diarrhea, abdominal distention, blood in stool   Muscular or skeletal--joint pain or swelling   Neurologic--headaches, syncope, abnormal movement  Hematologic--history of easy bruising and bleeding   Endocrine--thyroid enlargement, heat or cold intolerance, polyuria   Psychiatric--anxiety, depression      *-*-*-*-*-*-*-*-*-*-*-*-*-*-*-*-*-*-*-*-*-*-*-*-*-*-*-*-*-*-*-*-*-*-*-*-*-*-*-*-*-*-*-*-*-*-*-*-*-*-*-*-*-*  CURRENT OUTPATIENT MEDICATIONS:     Current Outpatient Medications:     ibuprofen (MOTRIN) 400 mg tablet, Take 1 tablet (400 mg total) by mouth every 8 (eight) hours, Disp: 45 tablet, Rfl: 0    naproxen (NAPROSYN) 500 mg tablet, Take 1 tablet (500 mg total) by mouth every 12 (twelve) hours as needed for mild pain or moderate pain (Patient not taking: Reported on 5/1/2021), Disp: 15 tablet, Rfl: 0    *-*-*-*-*-*-*-*-*-*-*-*-*-*-*-*-*-*-*-*-*-*-*-*-*-*-*-*-*-*-*-*-*-*-*-*-*-*-*-*-*-*-*-*-*-*-*-*-*-*-*-*-*-*  VITAL SIGNS:  Vitals:    06/08/21 1646   BP: 136/90   BP Location: Left arm   Patient Position: Sitting   Cuff Size: Standard   Pulse: 70   Weight: 87 6 kg (193 lb 3 2 oz)     Weight (last 2 days)     Date/Time   Weight    06/08/21 1646   87 6 (193 2)           ,   Wt Readings from Last 3 Encounters:   06/08/21 87 6 kg (193 lb 3 2 oz)   05/03/21 89 8 kg (198 lb)   04/15/21 93 kg (205 lb 0 4 oz)    , There is no height or weight on file to calculate BMI  *-*-*-*-*-*-*-*-*-*-*-*-*-*-*-*-*-*-*-*-*-*-*-*-*-*-*-*-*-*-*-*-*-*-*-*-*-*-*-*-*-*-*-*-*-*-*-*-*-*-*-*-*-*-  PHYSICAL EXAM:  General Appearance:    Alert, cooperative, no distress, appears stated age well built   Head, Eyes, ENT:    No obvious abnormality, moist mucous mebranes  Neck:   Supple, no carotid bruit or JVD   Back:     Symmetric, no curvature  Lungs:     Respirations unlabored  Clear to auscultation bilaterally,    Chest wall:    No tenderness or deformity   Heart:    Regular rate and rhythm, S1 and S2 normal, no murmur, rub  or gallop     Abdomen:     Soft, non-tender, No obvious masses, or organomegaly   Extremities:   Extremities warm, no cyanosis or edema    Skin: Skin color, texture, turgor normal, no rashes or lesions     *-*-*-*-*-*-*-*-*-*-*-*-*-*-*-*-*-*-*-*-*-*-*-*-*-*-*-*-*-*-*-*-*-*-*-*-*-*-*-*-*-*-*-*-*-*-*-*-*-*-*-*-*-*-  LABORATORY DATA: I have personally reviewed the available laboratory data          Potassium   Date Value Ref Range Status   05/01/2021 3 5 (L) 3 6 - 5 0 mmol/L Final   04/28/2021 3 7 3 5 - 5 3 mmol/L Final   01/15/2021 4 1 3 5 - 5 3 mmol/L Final     Chloride   Date Value Ref Range Status   05/01/2021 103 97 - 108 mmol/L Final   04/28/2021 102 100 - 108 mmol/L Final   01/15/2021 101 100 - 108 mmol/L Final     CO2   Date Value Ref Range Status   05/01/2021 28 22 - 30 mmol/L Final   04/28/2021 27 21 - 32 mmol/L Final   01/15/2021 31 21 - 32 mmol/L Final     BUN   Date Value Ref Range Status   05/01/2021 15 5 - 25 mg/dL Final   04/28/2021 16 5 - 25 mg/dL Final   01/15/2021 13 5 - 25 mg/dL Final     Creatinine   Date Value Ref Range Status   05/01/2021 0 99 0 70 - 1 50 mg/dL Final     Comment:     Standardized to IDMS reference method   04/28/2021 0 96 0 60 - 1 30 mg/dL Final     Comment:     Standardized to IDMS reference method   01/15/2021 1 02 0 60 - 1 30 mg/dL Final     Comment:     Standardized to IDMS reference method     eGFR   Date Value Ref Range Status   05/01/2021 108 >60 ml/min/1 73sq m Final   04/28/2021 112 ml/min/1 73sq m Final   01/15/2021 121 ml/min/1 73sq m Final     Calcium   Date Value Ref Range Status   05/01/2021 10 3 (H) 8 4 - 10 2 mg/dL Final   04/28/2021 9 2 8 3 - 10 1 mg/dL Final   01/15/2021 9 7 8 3 - 10 1 mg/dL Final     WBC   Date Value Ref Range Status   05/01/2021 9 20 4 50 - 11 00 Thousand/uL Final   04/28/2021 7 41 4 31 - 10 16 Thousand/uL Final   01/15/2021 8 21 4 31 - 10 16 Thousand/uL Final     Hemoglobin   Date Value Ref Range Status   05/01/2021 15 2 13 5 - 17 5 g/dL Final   04/28/2021 15 3 12 0 - 17 0 g/dL Final   01/15/2021 16 5 12 0 - 17 0 g/dL Final     Platelets   Date Value Ref Range Status   05/01/2021 243 150 - 450 Thousands/uL Final   04/28/2021 246 149 - 390 Thousands/uL Final   01/15/2021 249 149 - 390 Thousands/uL Final     No results found for: PT, PTT, INR  No results found for: CKMB, DIGOXIN  No results found for: TSH  No results found for: CHOL, HDL, LDL, TRIG   No results found for: HGBA1C  No results found for: BLOODCX, SPUTUMCULTUR, GRAMSTAIN, URINECX, WOUNDCULT, BODYFLUIDCUL, MRSACULTURE, INFLUAPCR, INFLUBPCR, RSVPCR, LEGIONELLAUR, CDIFFTOXINB    *-*-*-*-*-*-*-*-*-*-*-*-*-*-*-*-*-*-*-*-*-*-*-*-*-*-*-*-*-*-*-*-*-*-*-*-*-*-*-*-*-*-*-*-*-*-*-*-*-*-*-*-*-*-  RADIOLOGY RESULTS:  Ct Soft Tissue Neck Wo Contrast    Result Date: 5/3/2021  Impression: No evidence of radiopaque foreign body, soft tissue inflammation or airway obstruction  Workstation performed: [id number]       *-*-*-*-*-*-*-*-*-*-*-*-*-*-*-*-*-*-*-*-*-*-*-*-*-*-*-*-*-*-*-*-*-*-*-*-*-*-*-*-*-*-*-*-*-*-*-*-*-*-*-*-*-*-  LAST ECHOCARDIOGRAM AND OTHER CARDIOLOGY RESULTS:  No results found for this or any previous visit  No results found for this or any previous visit  No results found for this or any previous visit  No results found for this or any previous visit  *-*-*-*-*-*-*-*-*-*-*-*-*-*-*-*-*-*-*-*-*-*-*-*-*-*-*-*-*-*-*-*-*-*-*-*-*-*-*-*-*-*-*-*-*-*-*-*-*-*-*-*-*-*-  RADIOLOGY RESULTS:  Ct Soft Tissue Neck Wo Contrast    Result Date: 5/3/2021  Impression: No evidence of radiopaque foreign body, soft tissue inflammation or airway obstruction  Workstation performed: [id number]       *-*-*-*-*-*-*-*-*-*-*-*-*-*-*-*-*-*-*-*-*-*-*-*-*-*-*-*-*-*-*-*-*-*-*-*-*-*-*-*-*-*-*-*-*-*-*-*-*-*-*-*-*-*-  ECHOCARDIOGRAM AND OTHER CARDIOLOGY RESULTS:  No results found for this or any previous visit  No results found for this or any previous visit  No results found for this or any previous visit  No results found for this or any previous visit  *-*-*-*-*-*-*-*-*-*-*-*-*-*-*-*-*-*-*-*-*-*-*-*-*-*-*-*-*-*-*-*-*-*-*-*-*-*-*-*-*-*-*-*-*-*-*-*-*-*-*-*-*-*-  SIGNATURES:   [unfilled]   Lai Dooley MD     CC: No primary care provider on file     Self, Referral

## 2021-06-08 NOTE — ASSESSMENT & PLAN NOTE
Patient is a 24 year young man with no significant risk factors who recently had upper respiratory tract symptoms and has been experiencing on an of substernal chest pain  Description of pain is more suggestive of pleuritic and costochondritis like pain  On examination he does not have a pericardial rub  He has no fevers or chills  Overall he feels that his symptoms are much improved compared to when this started    -- I would like to give him a trial of ibuprofen 400 mg 3 times daily for total of 2 weeks to be taken with food  -- I am advising him that if symptoms are getting more frequent or worse over the next 2 weeks then he will need evaluation for possible pericarditis and pericardial effusion  He is advised to call us and let us know in that case  -- discussed with him warning signs which he should be watching for  These include but are not limited to: Increasing shortness of breath with activity, pain becoming worse, inability to lie in bed due to shortness of breath, decrease in exercise tolerance, swelling of lower extremities or any syncope or near-syncope

## 2021-06-19 ENCOUNTER — APPOINTMENT (EMERGENCY)
Dept: RADIOLOGY | Facility: HOSPITAL | Age: 21
End: 2021-06-19
Payer: COMMERCIAL

## 2021-06-19 ENCOUNTER — HOSPITAL ENCOUNTER (OUTPATIENT)
Facility: HOSPITAL | Age: 21
Setting detail: OBSERVATION
Discharge: HOME | End: 2021-06-19
Attending: EMERGENCY MEDICINE | Admitting: INTERNAL MEDICINE
Payer: COMMERCIAL

## 2021-06-19 VITALS
TEMPERATURE: 98.1 F | SYSTOLIC BLOOD PRESSURE: 131 MMHG | RESPIRATION RATE: 16 BRPM | OXYGEN SATURATION: 98 % | HEART RATE: 56 BPM | DIASTOLIC BLOOD PRESSURE: 80 MMHG

## 2021-06-19 DIAGNOSIS — R94.31 ABNORMAL EKG: ICD-10-CM

## 2021-06-19 DIAGNOSIS — R07.9 CHEST PAIN: Primary | ICD-10-CM

## 2021-06-19 PROBLEM — U07.1 COVID-19: Status: ACTIVE | Noted: 2021-06-19

## 2021-06-19 LAB
ALBUMIN SERPL BCP-MCNC: 4.4 G/DL (ref 3.5–5)
ALP SERPL-CCNC: 106 U/L (ref 46–116)
ALT SERPL W P-5'-P-CCNC: 34 U/L (ref 12–78)
ANION GAP SERPL CALCULATED.3IONS-SCNC: 6 MMOL/L (ref 4–13)
AST SERPL W P-5'-P-CCNC: 16 U/L (ref 5–45)
ATRIAL RATE: 77 BPM
BASOPHILS # BLD AUTO: 0.04 THOUSANDS/ΜL (ref 0–0.1)
BASOPHILS NFR BLD AUTO: 0 % (ref 0–1)
BILIRUB SERPL-MCNC: 0.19 MG/DL (ref 0.2–1)
BUN SERPL-MCNC: 13 MG/DL (ref 5–25)
CALCIUM SERPL-MCNC: 9.6 MG/DL (ref 8.3–10.1)
CHLORIDE SERPL-SCNC: 105 MMOL/L (ref 100–108)
CO2 SERPL-SCNC: 32 MMOL/L (ref 21–32)
CREAT SERPL-MCNC: 1.13 MG/DL (ref 0.6–1.3)
D DIMER PPP FEU-MCNC: <0.27 UG/ML FEU
EOSINOPHIL # BLD AUTO: 0.06 THOUSAND/ΜL (ref 0–0.61)
EOSINOPHIL NFR BLD AUTO: 1 % (ref 0–6)
ERYTHROCYTE [DISTWIDTH] IN BLOOD BY AUTOMATED COUNT: 13.8 % (ref 11.6–15.1)
GFR SERPL CREATININE-BSD FRML MDRD: 92 ML/MIN/1.73SQ M
GLUCOSE SERPL-MCNC: 105 MG/DL (ref 65–140)
HCT VFR BLD AUTO: 50.6 % (ref 36.5–49.3)
HGB BLD-MCNC: 16.1 G/DL (ref 12–17)
IMM GRANULOCYTES # BLD AUTO: 0.03 THOUSAND/UL (ref 0–0.2)
IMM GRANULOCYTES NFR BLD AUTO: 0 % (ref 0–2)
LYMPHOCYTES # BLD AUTO: 2.82 THOUSANDS/ΜL (ref 0.6–4.47)
LYMPHOCYTES NFR BLD AUTO: 32 % (ref 14–44)
MCH RBC QN AUTO: 25.8 PG (ref 26.8–34.3)
MCHC RBC AUTO-ENTMCNC: 31.8 G/DL (ref 31.4–37.4)
MCV RBC AUTO: 81 FL (ref 82–98)
MONOCYTES # BLD AUTO: 0.74 THOUSAND/ΜL (ref 0.17–1.22)
MONOCYTES NFR BLD AUTO: 8 % (ref 4–12)
NEUTROPHILS # BLD AUTO: 5.27 THOUSANDS/ΜL (ref 1.85–7.62)
NEUTS SEG NFR BLD AUTO: 59 % (ref 43–75)
NRBC BLD AUTO-RTO: 0 /100 WBCS
NT-PROBNP SERPL-MCNC: 13 PG/ML
P AXIS: 54 DEGREES
PLATELET # BLD AUTO: 248 THOUSANDS/UL (ref 149–390)
PMV BLD AUTO: 10.4 FL (ref 8.9–12.7)
POTASSIUM SERPL-SCNC: 3.9 MMOL/L (ref 3.5–5.3)
PR INTERVAL: 148 MS
PROT SERPL-MCNC: 8.5 G/DL (ref 6.4–8.2)
QRS AXIS: 47 DEGREES
QRSD INTERVAL: 88 MS
QT INTERVAL: 352 MS
QTC INTERVAL: 398 MS
RBC # BLD AUTO: 6.25 MILLION/UL (ref 3.88–5.62)
SARS-COV-2 RNA RESP QL NAA+PROBE: POSITIVE
SODIUM SERPL-SCNC: 143 MMOL/L (ref 136–145)
T WAVE AXIS: -17 DEGREES
TROPONIN I SERPL-MCNC: <0.02 NG/ML
VENTRICULAR RATE: 77 BPM
WBC # BLD AUTO: 8.96 THOUSAND/UL (ref 4.31–10.16)

## 2021-06-19 PROCEDURE — 99236 HOSP IP/OBS SAME DATE HI 85: CPT | Performed by: HOSPITALIST

## 2021-06-19 PROCEDURE — U0003 INFECTIOUS AGENT DETECTION BY NUCLEIC ACID (DNA OR RNA); SEVERE ACUTE RESPIRATORY SYNDROME CORONAVIRUS 2 (SARS-COV-2) (CORONAVIRUS DISEASE [COVID-19]), AMPLIFIED PROBE TECHNIQUE, MAKING USE OF HIGH THROUGHPUT TECHNOLOGIES AS DESCRIBED BY CMS-2020-01-R: HCPCS | Performed by: PHYSICIAN ASSISTANT

## 2021-06-19 PROCEDURE — U0005 INFEC AGEN DETEC AMPLI PROBE: HCPCS | Performed by: PHYSICIAN ASSISTANT

## 2021-06-19 PROCEDURE — NC001 PR NO CHARGE: Performed by: INTERNAL MEDICINE

## 2021-06-19 PROCEDURE — 71046 X-RAY EXAM CHEST 2 VIEWS: CPT

## 2021-06-19 PROCEDURE — 80053 COMPREHEN METABOLIC PANEL: CPT | Performed by: EMERGENCY MEDICINE

## 2021-06-19 PROCEDURE — 99285 EMERGENCY DEPT VISIT HI MDM: CPT | Performed by: EMERGENCY MEDICINE

## 2021-06-19 PROCEDURE — 84484 ASSAY OF TROPONIN QUANT: CPT | Performed by: EMERGENCY MEDICINE

## 2021-06-19 PROCEDURE — 84484 ASSAY OF TROPONIN QUANT: CPT | Performed by: PHYSICIAN ASSISTANT

## 2021-06-19 PROCEDURE — RECHECK: Performed by: HOSPITALIST

## 2021-06-19 PROCEDURE — 93010 ELECTROCARDIOGRAM REPORT: CPT | Performed by: INTERNAL MEDICINE

## 2021-06-19 PROCEDURE — 36415 COLL VENOUS BLD VENIPUNCTURE: CPT | Performed by: EMERGENCY MEDICINE

## 2021-06-19 PROCEDURE — 93005 ELECTROCARDIOGRAM TRACING: CPT

## 2021-06-19 PROCEDURE — 85379 FIBRIN DEGRADATION QUANT: CPT | Performed by: PHYSICIAN ASSISTANT

## 2021-06-19 PROCEDURE — 83880 ASSAY OF NATRIURETIC PEPTIDE: CPT | Performed by: EMERGENCY MEDICINE

## 2021-06-19 PROCEDURE — 99285 EMERGENCY DEPT VISIT HI MDM: CPT

## 2021-06-19 PROCEDURE — 85025 COMPLETE CBC W/AUTO DIFF WBC: CPT | Performed by: EMERGENCY MEDICINE

## 2021-06-19 RX ORDER — KETOROLAC TROMETHAMINE 30 MG/ML
15 INJECTION, SOLUTION INTRAMUSCULAR; INTRAVENOUS ONCE
Status: DISCONTINUED | OUTPATIENT
Start: 2021-06-19 | End: 2021-06-19

## 2021-06-19 RX ORDER — ONDANSETRON 2 MG/ML
4 INJECTION INTRAMUSCULAR; INTRAVENOUS EVERY 6 HOURS PRN
Status: DISCONTINUED | OUTPATIENT
Start: 2021-06-19 | End: 2021-06-19 | Stop reason: HOSPADM

## 2021-06-19 NOTE — DISCHARGE SUMMARY
[facility]  Discharge- Alexander Cochran 2000, 24 y o  male MRN: [id number]  Unit/Bed#: [facility] 2 [facility] 87 [id number] Encounter: [id number]  Primary Care Provider: No primary care provider on file  Date and time admitted to hospital: 6/19/2021 12:38 AM    * Chest pain  Assessment & Plan  Patient ruled out for MI  He was evaluated by cardiology and they do not feel that he has pericarditis and does not need further workup  He is found to be COVID positive and that is likely causing his symptoms  He should recover in about 2 weeks  COVID-19  Assessment & Plan  Found to be covid positive  This is likely causing his symptoms  He was made aware of the diagnosis  He does not require any treatment  He is not short of breath and his oxygen saturations are normal     I gave him COVID instructions in Ethiopian  Discharging Physician / Practitioner: Vanessa Pike DO  PCP: No primary care provider on file  Admission Date:   Admission Orders (From admission, onward)     Ordered        06/19/21 0308  Place in Observation  Once                   Discharge Date: 06/19/21    Medical Problems     Resolved Problems  Date Reviewed: 6/19/2021    None                  Consultations During Hospital Stay:  · cardiology            Reason for Admission: chest pain  Hospital Course:     Alexander Cochran is a 24 y o  male patient who originally presented to the hospital on 6/19/2021 due to chest pain for about a month  It occurs at rest    There was concern that it may be pericarditis because he had a recent strep pharyngitis  Patient ruled out for MI  Cardiology evaluated and did NOT feel that this was pericarditis  In the end he was found to be COVID positive and this is likely causing his symptoms  He does not require any treatment with normal vital signs and oxygen saturations  I will send him home with COVID quarantine instructions in Ethiopian      Please see above list of diagnoses and related plan for additional information  Condition at Discharge: stable       Discharge Day Visit / Exam:     Subjective:  No chest pain right now  No SOB    He feels tired  Vitals: Blood Pressure: 131/80 (06/19/21 0800)  Pulse: 56 (06/19/21 0800)  Temperature: 98 1 °F (36 7 °C) (06/19/21 0800)  Temp Source: Oral (06/19/21 0047)  Respirations: 16 (06/19/21 0800)  SpO2: 98 % (06/19/21 0800)    Exam:     Physical Exam  Vitals and nursing note reviewed  HENT:      Head: Normocephalic and atraumatic  Eyes:      Pupils: Pupils are equal, round, and reactive to light  Cardiovascular:      Rate and Rhythm: Normal rate and regular rhythm  Heart sounds: No murmur heard  No friction rub  No gallop  Pulmonary:      Effort: Pulmonary effort is normal       Breath sounds: Normal breath sounds  No wheezing or rales  Abdominal:      General: Bowel sounds are normal       Palpations: Abdomen is soft  Tenderness: There is no abdominal tenderness  Musculoskeletal:      Right lower leg: No edema  Left lower leg: No edema            Discharge instructions/Information to patient and family:   See after visit summary for information provided to patient and family  Provisions for Follow-Up Care:  See after visit summary for information related to follow-up care and any pertinent home health orders  Disposition:     Home       Discharge Statement:  I spent 35 minutes discharging the patient  This time was spent on the day of discharge  I had direct contact with the patient on the day of discharge  Greater than 50% of the total time was spent examining patient, answering all patient questions, arranging and discussing plan of care with patient as well as directly providing post-discharge instructions  Additional time then spent on discharge activities  Discharge Medications:  See after visit summary for reconciled discharge medications provided to patient and family  ** Please Note: This note has been constructed using a voice recognition system **

## 2021-06-19 NOTE — PLAN OF CARE
Problem: PAIN - ADULT  Goal: Verbalizes/displays adequate comfort level or baseline comfort level  Description: Interventions:  - Encourage patient to monitor pain and request assistance  - Assess pain using appropriate pain scale  - Administer analgesics based on type and severity of pain and evaluate response  - Implement non-pharmacological measures as appropriate and evaluate response  - Consider cultural and social influences on pain and pain management  - Notify physician/advanced practitioner if interventions unsuccessful or patient reports new pain  6/19/2021 1207 by Virgie Rossi RN  Outcome: Adequate for Discharge  6/19/2021 1036 by Virgie Rossi RN  Outcome: Progressing     Problem: INFECTION - ADULT  Goal: Absence or prevention of progression during hospitalization  Description: INTERVENTIONS:  - Assess and monitor for signs and symptoms of infection  - Monitor lab/diagnostic results  - Monitor all insertion sites, i e  indwelling lines, tubes, and drains  - Monitor endotracheal if appropriate and nasal secretions for changes in amount and color  - Richards appropriate cooling/warming therapies per order  - Administer medications as ordered  - Instruct and encourage patient and family to use good hand hygiene technique  - Identify and instruct in appropriate isolation precautions for identified infection/condition  6/19/2021 1207 by Virgie Rossi RN  Outcome: Adequate for Discharge  6/19/2021 1036 by Virgie Rossi RN  Outcome: Progressing     Problem: SAFETY ADULT  Goal: Patient will remain free of falls  Description: INTERVENTIONS:  - Educate patient/family on patient safety including physical limitations  - Instruct patient to call for assistance with activity   - Consult OT/PT to assist with strengthening/mobility   - Keep Call bell within reach  - Keep bed low and locked with side rails adjusted as appropriate  - Keep care items and personal belongings within reach  - Initiate and maintain comfort rounds  - Make Fall Risk Sign visible to staff  - Offer Toileting every  Hours, in advance of need  - Initiate/Maintain alarm  - Obtain necessary fall risk management equipment:   - Apply yellow socks and bracelet for high fall risk patients  - Consider moving patient to room near nurses station  6/19/2021 1207 by Kishan Limon RN  Outcome: Adequate for Discharge  6/19/2021 1036 by Kishan Limon RN  Outcome: Progressing  Goal: Maintain or return to baseline ADL function  Description: INTERVENTIONS:  -  Assess patient's ability to carry out ADLs; assess patient's baseline for ADL function and identify physical deficits which impact ability to perform ADLs (bathing, care of mouth/teeth, toileting, grooming, dressing, etc )  - Assess/evaluate cause of self-care deficits   - Assess range of motion  - Assess patient's mobility; develop plan if impaired  - Assess patient's need for assistive devices and provide as appropriate  - Encourage maximum independence but intervene and supervise when necessary  - Involve family in performance of ADLs  - Assess for home care needs following discharge   - Consider OT consult to assist with ADL evaluation and planning for discharge  - Provide patient education as appropriate  6/19/2021 1207 by Kishan Limon RN  Outcome: Adequate for Discharge  6/19/2021 1036 by Kishan Limon RN  Outcome: Progressing  Goal: Maintains/Returns to pre admission functional level  Description: INTERVENTIONS:  - Perform BMAT or MOVE assessment daily    - Set and communicate daily mobility goal to care team and patient/family/caregiver  - Collaborate with rehabilitation services on mobility goals if consulted  - Perform Range of Motion  times a day  - Reposition patient every  hours    - Dangle patient  times a day  - Stand patient  times a day  - Ambulate patient  times a day  - Out of bed to chair  times a day   - Out of bed for meals  times a day  - Out of bed for toileting  - Record patient progress and toleration of activity level   6/19/2021 1207 by Jessica Rubni RN  Outcome: Adequate for Discharge  6/19/2021 1036 by Jessica Rubin RN  Outcome: Progressing     Problem: DISCHARGE PLANNING  Goal: Discharge to home or other facility with appropriate resources  Description: INTERVENTIONS:  - Identify barriers to discharge w/patient and caregiver  - Arrange for needed discharge resources and transportation as appropriate  - Identify discharge learning needs (meds, wound care, etc )  - Arrange for interpretive services to assist at discharge as needed  - Refer to Case Management Department for coordinating discharge planning if the patient needs post-hospital services based on physician/advanced practitioner order or complex needs related to functional status, cognitive ability, or social support system  6/19/2021 1207 by Jessica Rubin RN  Outcome: Adequate for Discharge  6/19/2021 1036 by Jessica Rubin RN  Outcome: Progressing     Problem: CARDIOVASCULAR - ADULT  Goal: Maintains optimal cardiac output and hemodynamic stability  Description: INTERVENTIONS:  - Monitor I/O, vital signs and rhythm  - Monitor for S/S and trends of decreased cardiac output  - Administer and titrate ordered vasoactive medications to optimize hemodynamic stability  - Assess quality of pulses, skin color and temperature  - Assess for signs of decreased coronary artery perfusion  - Instruct patient to report change in severity of symptoms  6/19/2021 1207 by Jessica Rubin RN  Outcome: Adequate for Discharge  6/19/2021 1036 by Jessica Rubin RN  Outcome: Progressing  Goal: Absence of cardiac dysrhythmias or at baseline rhythm  Description: INTERVENTIONS:  - Continuous cardiac monitoring, vital signs, obtain 12 lead EKG if ordered  - Administer antiarrhythmic and heart rate control medications as ordered  - Monitor electrolytes and administer replacement therapy as ordered  6/19/2021 1207 by Kathrene Lennox, RN  Outcome: Adequate for Discharge  6/19/2021 1036 by Kathrene Lennox, RN  Outcome: Progressing

## 2021-06-19 NOTE — ASSESSMENT & PLAN NOTE
Found to be covid positive  This is likely causing his symptoms  He was made aware of the diagnosis  He does not require any treatment  He is not short of breath and his oxygen saturations are normal     I gave him COVID instructions in [place]

## 2021-06-19 NOTE — H&P
[facility]  H&P- Roopa Petersen 2000, 24 y o  male MRN: [id number]  Unit/Bed#: [facility] 68 2 [facility] [place] 87 222-01 Encounter: [id number]  Primary Care Provider: No primary care provider on file  Date and time admitted to hospital: 6/19/2021 12:38 AM    * Chest pain  Assessment & Plan  W/recent op cardiology concerning for possible pericarditis  Pt does have recent hx of strep pharyngitis treated w/IM PCN  No murmur appreciated   ekg nonspecific but w/new twi in lateral leads  Pt's cp is pleuritic and sometimes worse w/palpation  -will cycle troponins ekgs  Check ddimer given pleurisy and travel to [place]/in last 2 months  Will check covid  -consult cardiology given concern for chronic idiopathic pericarditis and consideration of further diagnostics    VTE Prophylaxis: Pharmacologic VTE Prophylaxis contraindicated due to vte screen  / reason for no mechanical VTE prophylaxis vte screen   Code Status: fc  POLST: There is no POLST form on file for this patient (pre-hospital)  Discussion with family:     Anticipated Length of Stay:  Patient will be admitted on an Observation basis with an anticipated length of stay of  Less than 2 midnights  Justification for Hospital Stay: cp possible pericarditis    Total Time for Visit, including Counseling / Coordination of Care: 45 minutes  Greater than 50% of this total time spent on direct patient counseling and coordination of care  Chief Complaint:   cp    History of Present Illness:    Roopa Petersen is a 24 y o  male who presents with no significant pmh coming to hospital for cp  Pt [place] speaking only  Conversation occurred between pt/myself in [address]  He notes chronic intermittent substernal nonradiating cp for last couple of months  It is often but not always pleuritic and sometimes worsened w/palpation  Sometimes it is associated w/cough and sore throat which recently he experienced  He has fatigue but no fevers/chills    No sick contacts  Did travel to [place] months ago but denies outright LYLES  Had recent op cardiology visit for abn ekg and was felt to have possible chronic pericarditis  He was given motrin and recommended to be seen for increasing frequency of pain for consideration of evaluation  He notes that over the last several days his s/sx have been occurring more frequently but not more severe in intensity and came in for evaluation  Of note he denies postprandial pain or worsening pain at night that wakes  Him up  Some nausea no vomiting/diarrhea/new rash  Some chronic thoracic midline back pain but no other arthralgias  Review of Systems:    Review of Systems   Constitutional: Positive for fatigue  Negative for appetite change, chills and fever  HENT: Positive for sore throat  Respiratory: Positive for cough  Negative for shortness of breath  Cardiovascular: Positive for chest pain  Gastrointestinal: Positive for nausea  All other systems reviewed and are negative  Past Medical and Surgical History:     History reviewed  No pertinent past medical history  History reviewed  No pertinent surgical history  Meds/Allergies:    Prior to Admission medications    Medication Sig Start Date End Date Taking? Authorizing Provider   ibuprofen (MOTRIN) 400 mg tablet Take 1 tablet (400 mg total) by mouth every 8 (eight) hours 6/8/21   Chad Castle MD   naproxen (NAPROSYN) 500 mg tablet Take 1 tablet (500 mg total) by mouth every 12 (twelve) hours as needed for mild pain or moderate pain  Patient not taking: Reported on 5/1/2021 4/15/21 6/19/21  DO HO Lynn have reviewed home medications with patient personally      Allergies: No Known Allergies    Social History:     Marital Status: Single   Occupation:   Patient Pre-hospital Living Situation:   Patient Pre-hospital Level of Mobility:   Patient Pre-hospital Diet Restrictions:   Substance Use History:   Social History     Substance and Sexual Activity   Alcohol Use Yes    Comment: occasional     Social History     Tobacco Use   Smoking Status Never Smoker   Smokeless Tobacco Never Used     Social History     Substance and Sexual Activity   Drug Use Not Currently       Family History:  History reviewed  No pertinent family history  Physical Exam:     Vitals:   Blood Pressure: 152/87 (06/19/21 0347)  Pulse: 72 (06/19/21 0347)  Temperature: 98 1 °F (36 7 °C) (06/19/21 0347)  Temp Source: Oral (06/19/21 0047)  Respirations: 18 (06/19/21 0221)  SpO2: 98 % (06/19/21 0347)    Physical Exam  Vitals reviewed  Constitutional:       General: He is not in acute distress  Appearance: He is ill-appearing  He is not toxic-appearing or diaphoretic  HENT:      Head: Normocephalic and atraumatic  Right Ear: External ear normal       Left Ear: External ear normal       Nose: Nose normal    Eyes:      Extraocular Movements: Extraocular movements intact  Cardiovascular:      Rate and Rhythm: Normal rate and regular rhythm  Heart sounds: No murmur heard  No friction rub  No gallop  Pulmonary:      Breath sounds: No wheezing, rhonchi or rales  Chest:      Chest wall: No tenderness  Abdominal:      General: There is no distension  Palpations: Abdomen is soft  There is no mass  Tenderness: There is no abdominal tenderness  There is no guarding or rebound  Hernia: No hernia is present  Musculoskeletal:      Cervical back: Normal range of motion  Right lower leg: No edema  Left lower leg: No edema  Skin:     General: Skin is warm and dry  Neurological:      Mental Status: He is alert  Mental status is at baseline  Psychiatric:         Mood and Affect: Mood normal            Additional Data:     Lab Results: I have personally reviewed pertinent reports        Results from last 7 days   Lab Units 06/19/21  0217   WBC Thousand/uL 8 96   HEMOGLOBIN g/dL 16 1   HEMATOCRIT % 50 6*   PLATELETS Thousands/uL 248   NEUTROS PCT % 59   LYMPHS PCT % 32   MONOS PCT % 8   EOS PCT % 1     Results from last 7 days   Lab Units 06/19/21  0217   SODIUM mmol/L 143   POTASSIUM mmol/L 3 9   CHLORIDE mmol/L 105   CO2 mmol/L 32   BUN mg/dL 13   CREATININE mg/dL 1 13   ANION GAP mmol/L 6   CALCIUM mg/dL 9 6   ALBUMIN g/dL 4 4   TOTAL BILIRUBIN mg/dL 0 19*   ALK PHOS U/L 106   ALT U/L 34   AST U/L 16   GLUCOSE RANDOM mg/dL 105                       Imaging: I have personally reviewed pertinent films in PACS and no acute cardiopulmonary disease    XR chest 2 views   ED Interpretation by Emerson Duffy DO (06/19 3638)   NAD          EKG, Pathology, and Other Studies Reviewed on Admission:   · EKG: nsr   twi in v4-6 new    Allscripts / Epic Records Reviewed: Yes     ** Please Note: This note has been constructed using a voice recognition system   **

## 2021-06-19 NOTE — CONSULTS
Consultation - Electrophysiology-Cardiology (EP)   Henri Kaur 24 y o  male MRN: [id number]  Unit/Bed#: [facility] 2 [facility] [place] 87 222-01 Encounter: [id number]    Inpatient consult to Cardiology  Consult performed by: Lisa Castellano MD  Consult ordered by: Nissa Robles PA-C        Physician Requesting Consult: Killian Rios DO  Reason for Consult / Principal Problem:  Atypical  Chest pain    The patient has been diagnosed to have COVID and has been put under respiratory isolation    Limited evaluation was done    His chest pain is atypical from history by hospitalist team    EKG reviewed:  - no ST segment changes suggestive of ST-elevation MI   - no ST segment changes suggestive of acute pericarditis  - ST depression and T-wave abnormality noted in lead III and V5 V6 - these have been present in prior EKGs from April 2021      Troponins are negative, less than 0 02      No acute cardiac interventions are recommended  Patient should have an echocardiogram to look at chamber size, valve function, any fusion

## 2021-06-19 NOTE — DISCHARGE INSTR - AVS FIRST PAGE
CoVID-19 Jax de cuidado domiciliario  Lynn proveedor de [facility] y/o personal de analisa pública lo/la ha evaluado y ha determinado que no necesita ser hospitalizado/a en checo momento  ? En checo momento usted puede ser aislado/a en casa donde será monitoreado/a por el personal de lynn departamento de analisa local o estatal  Debe seguir cuidadosamente los pasos de prevención y aislamiento que se indican a continuación, hasta que un proveedor de atención médica o el departamento de analisa local o estatal indique que puede volver a shannan actividades normales  Quédate en casa excepto si necesita recibir [employer] que están levemente enfermas con COVID-19 pueden aislarse en casa precious lynn recuperacion  Usted debe restringir las actividades fuera de lynn hogar, excepto para recibir [employer]  No se presente al Светлана Human, a la escuela o en áreas públicas  Evite el uso del transporte público, el uso compartido de transporte o los taxis  Aislese de [employer] y [employer] en lynn domicilio  Personas: En la medida de lo posible, debe quedarte en ny habitación específica y lejos de otras personas en lynn hogar  Además, debe usar un baño separado, si está disponible  Animales: Debe restringir el contacto con mascotas y otros animales [address] enfermo/a con COVID-19, al igual que lo haría con [employer]  Aunque no prakash habido informes de mascotas u otros animales que se enferman con COVID-19, todavía se recomienda que las personas enfermas con COVID-19 limiten el contacto con los animales hasta que se sepa más información sobre el virus  En lo posible, pida a otro miembro de lynn hogar que cuide de shannan animales mientras esté enfermo/a  Si está enfermo/a con COVID-19, evite el contacto con lynn mascota, incluyendo acariciar, abrazar, besar o ser lamido/a, al igual que compartir alimentos   Si debe cuidar de lynn mascota o estar cerca de [employer] está enfermo/a, lávese las obdulia antes y KOAMBER de interactuar con las mascotas y use ny máscara facial  Consulte COVID-19 y Animales para obtener más información  Llame antes de visitar a lynn médico  Si tiene [employer], llame al proveedor de [employer] y dígale que tiene o puede tener COVID-19  [place] ayudará al [employer] del proveedor de atención médica a yunier medidas para evitar que otras personas se infecten o expongan  Utilize máscara facial  Debe usar mascara facial cuando esté cerca de otras personas (por ejemplo, compartir ny habitación o vehículo) o mascotas y antes de entrar en la oficina de un proveedor de [employer] [place] [employer]  Si usted no puede usar máscara facial (por ejemplo, porque causa problemas para respirar), entonces las personas que viven con usted no deben permanecer en la misma habitación con usted, o deben usar máscara facial si entran a lynn habitación  Ruthellen Idler lynn tos y/o estornudos   Cúbrase la boca y la Geanie Askew con un pañuelo desechable cuando tosa o estornude  Tire los pañuelos usados en un contenedor de basura que tenga cobertura  Lávese las obdulia inmediatamente con agua y jabón precious al menos 21 segundos o, si no hay agua y jabón disponibles, límpiese las obdulia con un desinfectante de obdulia a base de alcohol que contenga al menos un 60% de alcohol  Límpiese las obdulia con frecuencia  [employer] las obdulia a menudo con agua y jabón precious al menos 20 segundos, especialmente después de sonarse la nariz, toser o estornudar, ir al baño, y antes de comer o preparar alimentos  Si agua y jabón no están disponibles fácilmente, utilize un desinfectante de obdulia a base de alcohol con al menos un 60% de alcohol, cubriendo todas las superficies de shannan obdulia y frotándolas hasta que se sientan secas  Ginger Cap y agua son la mejor opción si las obdulia están visiblemente sucias  Evite tocarse los ojos, la nariz y la boca con las obdulia sin Cleophas Fede    Evite compartir artículos personales en el hogar  No debe compartir platos, vasos para beber, tazas, utensilios para comer, toallas o ropa de cama con otras personas o mascotas en lynn hogar  Después de [employer], deben lavarse muy idania con agua y [place]  Limpie todas las superficies "de toque frequente" todos los [address] superficies de toque fecuente incluyen encimeras o [place], mesas, escritorios, pomos o perillas de miguel, accesorios de baño, inodoros, teléfonos, teclados, tabletas y [address] de noche  Además, limpie las superficies que puedan contener bismark, heces fecales o líquidos corporales  Utilice un spray de limpieza para el hogar o ny toallita desechable, de acuerdo con las instrucciones de la etiqueta del product utilizado para limpiar  Las [employer] contienen instrucciones para un uso seguro y eficaz del producto de limpieza, incluidas las precauciones que deben tomarse al aplicar el producto, incluyendo el uso de guantes y asegurarse de que haya ny buena ventilación precious el uso del producto  Monitorea shannan síntomas  Busca atención médica inmediata si tu enfermedad está empeorando (por ejemplo, dificultad para respirar)  Antes de buscar Isabelle Leap, llame a lynn proveedor de Isabelle Leap y dígale que tiene, o está siendo evaluado para, COVID-19  Navid ny mascara facial antes de entrar en las instalaciones  Estos pasos ayudarán al [employer] del proveedor de atención médica a evitar que otras personas en la oficina o la osmani de espera se infecten o expongan  [place] a lynn proveedor de [employer] llame al departamento de analisa local o estatal  Las personas que se sometan a un seguimiento activo o que se les facilite la autosupervisión deben seguir las instrucciones proporcionadas por lynn departamento de analisa local o profesionales de analisa ocupacional, según sea apropiado  Si tiene ny emergencia médica y necesita llamar al 911, notifique al personal de despacho que tiene o está siendo evaluado para COVID-19   Si es posible, pongase ny mascara facial antes de que lleguen los servicios médicos de emergencia  Discontinuar el aislamiento del hogar  Los pacientes con COVID-19 confirmado deben permanecer bajo precauciones de aislamiento en el hogar hasta que se cumplan las siguientes condiciones:   No hughes tenido fiebre precious al menos 24 horas (es decir, un día completos sin fiebre sin el medicamento que reduce la fiebre)  Y   otros síntomas hughes linda (por ejemplo, cuando lynn tos o falta de aire hughes linda)  Y   Si tuvo ny enfermedad leve o moderada, hughes pasado al menos 10 gris desde que aparecieron los síntomas por primera vez o si la enfermedad es grave (necesita oxígeno) o está inmunosuprimido, hughes pasado al menos 21 gris desde que aparecieron los primeros síntomas  Los pacientes con COVID-19 confirmado también deben notificar a los contactos cercanos (incluido lynn lugar de trabajo) y pedirles que se pongan en cuarentena  Actualmente, el contacto cercano se define teresa estar dentro de los 6 pies precious 15 minutos o más desde el período 24 horas comenzando 48 horas antes del inicio de los síntomas hasta el momento en que el paciente se aisló  El paciente debe indicar a los contactos cercanos de pacientes diagnosticados con COVID-19 que se pongan en cuarentena precious 14 días desde la última vez que tuvieron lynn último contacto con el Cristela      Source: [employer] fi

## 2021-06-19 NOTE — ASSESSMENT & PLAN NOTE
Patient ruled out for MI  He was evaluated by cardiology and they do not feel that he has pericarditis and does not need further workup  He is found to be COVID positive and that is likely causing his symptoms  He should recover in about 2 weeks

## 2021-06-19 NOTE — PLAN OF CARE
Problem: PAIN - ADULT  Goal: Verbalizes/displays adequate comfort level or baseline comfort level  Description: Interventions:  - Encourage patient to monitor pain and request assistance  - Assess pain using appropriate pain scale  - Administer analgesics based on type and severity of pain and evaluate response  - Implement non-pharmacological measures as appropriate and evaluate response  - Consider cultural and social influences on pain and pain management  - Notify physician/advanced practitioner if interventions unsuccessful or patient reports new pain  Outcome: Progressing     Problem: INFECTION - ADULT  Goal: Absence or prevention of progression during hospitalization  Description: INTERVENTIONS:  - Assess and monitor for signs and symptoms of infection  - Monitor lab/diagnostic results  - Monitor all insertion sites, i e  indwelling lines, tubes, and drains  - Monitor endotracheal if appropriate and nasal secretions for changes in amount and color  - Roscoe appropriate cooling/warming therapies per order  - Administer medications as ordered  - Instruct and encourage patient and family to use good hand hygiene technique  - Identify and instruct in appropriate isolation precautions for identified infection/condition  Outcome: Progressing       Problem: DISCHARGE PLANNING  Goal: Discharge to home or other facility with appropriate resources  Description: INTERVENTIONS:  - Identify barriers to discharge w/patient and caregiver  - Arrange for needed discharge resources and transportation as appropriate  - Identify discharge learning needs (meds, wound care, etc )  - Arrange for interpretive services to assist at discharge as needed  - Refer to Case Management Department for coordinating discharge planning if the patient needs post-hospital services based on physician/advanced practitioner order or complex needs related to functional status, cognitive ability, or social support system  Outcome: Progressing Problem: CARDIOVASCULAR - ADULT  Goal: Maintains optimal cardiac output and hemodynamic stability  Description: INTERVENTIONS:  - Monitor I/O, vital signs and rhythm  - Monitor for S/S and trends of decreased cardiac output  - Administer and titrate ordered vasoactive medications to optimize hemodynamic stability  - Assess quality of pulses, skin color and temperature  - Assess for signs of decreased coronary artery perfusion  - Instruct patient to report change in severity of symptoms  Outcome: Progressing  Goal: Absence of cardiac dysrhythmias or at baseline rhythm  Description: INTERVENTIONS:  - Continuous cardiac monitoring, vital signs, obtain 12 lead EKG if ordered  - Administer antiarrhythmic and heart rate control medications as ordered  - Monitor electrolytes and administer replacement therapy as ordered  Outcome: Progressing   Goal: Maintain or return to baseline ADL function  Description: INTERVENTIONS:  -  Assess patient's ability to carry out ADLs; assess patient's baseline for ADL function and identify physical deficits which impact ability to perform ADLs (bathing, care of mouth/teeth, toileting, grooming, dressing, etc )  - Assess/evaluate cause of self-care deficits   - Assess range of motion  - Assess patient's mobility; develop plan if impaired  - Assess patient's need for assistive devices and provide as appropriate  - Encourage maximum independence but intervene and supervise when necessary  - Involve family in performance of ADLs  - Assess for home care needs following discharge   - Consider OT consult to assist with ADL evaluation and planning for discharge  - Provide patient education as appropriate  Outcome: Progressing

## 2021-06-19 NOTE — ED PROVIDER NOTES
History  Chief Complaint   Patient presents with    Chest Pain     mid to left cp ongoing intermittently for a month  worse with movement  Patient presents for persistent chest pain for over a month  Has been evaluated multiple times for this in the emergency department and had a Cardiology appointment a few weeks ago  Patient also has an abnormal EKG which is being followed by Cardiology  Patient presents tonight for persistent pain  States that there are no other new symptoms but is just still having persistent pain  He has shortness of breath but this is now improved  He states that he is taking the ibuprofen that was prescribed by Cardiology  History provided by:  Patient and relative   used: Yes    Chest Pain  Pain location:  Substernal area  Pain radiates to:  Does not radiate  Pain radiates to the back: no    Pain severity:  Moderate  Onset quality:  Gradual  Timing:  Constant  Progression:  Unchanged  Chronicity:  New  Relieved by:  Nothing  Worsened by: Movement and certain positions (lying flat)  Ineffective treatments:  Rest and certain positions (NSAIDs)  Associated symptoms: fatigue    Associated symptoms: no abdominal pain, no back pain, no cough, no dizziness, no fever, no nausea, no shortness of breath and not vomiting    Risk factors: male sex    Risk factors: no coronary artery disease, no diabetes mellitus and no hypertension        Prior to Admission Medications   Prescriptions Last Dose Informant Patient Reported? Taking?   ibuprofen (MOTRIN) 400 mg tablet   No No   Sig: Take 1 tablet (400 mg total) by mouth every 8 (eight) hours      Facility-Administered Medications: None       History reviewed  No pertinent past medical history  History reviewed  No pertinent surgical history  History reviewed  No pertinent family history  I have reviewed and agree with the history as documented      E-Cigarette/Vaping     E-Cigarette/Vaping Substances     Social History     Tobacco Use    Smoking status: Never Smoker    Smokeless tobacco: Never Used   Substance Use Topics    Alcohol use: Yes     Comment: occasional    Drug use: Not Currently       Review of Systems   Constitutional: Positive for fatigue  Negative for chills and fever  Eyes: Negative for visual disturbance  Respiratory: Negative for cough, chest tightness and shortness of breath  Cardiovascular: Positive for chest pain  Negative for leg swelling  Gastrointestinal: Negative for abdominal pain, nausea and vomiting  Musculoskeletal: Negative for back pain and neck pain  Skin: Negative for color change, pallor, rash and wound  Allergic/Immunologic: Negative for immunocompromised state  Neurological: Negative for dizziness, syncope and light-headedness  All other systems reviewed and are negative  Physical Exam  Physical Exam  Vitals and nursing note reviewed  Constitutional:       General: He is not in acute distress  Appearance: He is well-developed  He is not ill-appearing, toxic-appearing or diaphoretic  HENT:      Head: Normocephalic and atraumatic  Eyes:      Conjunctiva/sclera: Conjunctivae normal       Pupils: Pupils are equal, round, and reactive to light  Neck:      Vascular: No JVD  Trachea: No tracheal deviation  Cardiovascular:      Rate and Rhythm: Normal rate and regular rhythm  Heart sounds: Normal heart sounds  No murmur heard  No friction rub  Pulmonary:      Effort: Pulmonary effort is normal  No respiratory distress  Breath sounds: Normal breath sounds  No wheezing or rales  Abdominal:      General: There is no distension  Palpations: Abdomen is soft  Tenderness: There is no abdominal tenderness  There is no guarding or rebound  Musculoskeletal:         General: No tenderness or deformity  Normal range of motion  Cervical back: Normal range of motion and neck supple  Skin:     General: Skin is warm and dry  Neurological:      Mental Status: He is alert and oriented to person, place, and time           Vital Signs  ED Triage Vitals   Temperature Pulse Respirations Blood Pressure SpO2   06/19/21 0047 06/19/21 0042 06/19/21 0042 06/19/21 0042 06/19/21 0042   98 8 °F (37 1 °C) 95 18 164/94 100 %      Temp Source Heart Rate Source Patient Position - Orthostatic VS BP Location FiO2 (%)   06/19/21 0047 06/19/21 0042 06/19/21 0042 06/19/21 0042 --   Oral Monitor Sitting Left arm       Pain Score       06/19/21 0042       5           Vitals:    06/19/21 0042 06/19/21 0221 06/19/21 0347   BP: 164/94 158/92 152/87   Pulse: 95 76 72   Patient Position - Orthostatic VS: Sitting Sitting          Visual Acuity      ED Medications  Medications   ketorolac (TORADOL) injection 15 mg (15 mg Intravenous Not Given 6/19/21 0221)       Diagnostic Studies  Results Reviewed     Procedure Component Value Units Date/Time    NT-BNP PRO [[id number]]  (Normal) Collected: 06/19/21 0217    Lab Status: Final result Specimen: Blood from Arm, Left Updated: 06/19/21 0243     NT-proBNP 13 pg/mL     Troponin I [[id number]]  (Normal) Collected: 06/19/21 0217    Lab Status: Final result Specimen: Blood from Arm, Left Updated: 06/19/21 0238     Troponin I <0 02 ng/mL     Comprehensive metabolic panel [[id number]]  (Abnormal) Collected: 06/19/21 0217    Lab Status: Final result Specimen: Blood from Arm, Left Updated: 06/19/21 0236     Sodium 143 mmol/L      Potassium 3 9 mmol/L      Chloride 105 mmol/L      CO2 32 mmol/L      ANION GAP 6 mmol/L      BUN 13 mg/dL      Creatinine 1 13 mg/dL      Glucose 105 mg/dL      Calcium 9 6 mg/dL      AST 16 U/L      ALT 34 U/L      Alkaline Phosphatase 106 U/L      Total Protein 8 5 g/dL      Albumin 4 4 g/dL      Total Bilirubin 0 19 mg/dL      eGFR 92 ml/min/1 73sq m     Narrative:      [place] guidelines for Chronic Kidney Disease (CKD):     Stage 1 with normal or high GFR (GFR > 90 mL/min/1 73 square meters)    Stage 2 Mild CKD (GFR = 60-89 mL/min/1 73 square meters)    Stage 3A Moderate CKD (GFR = 45-59 mL/min/1 73 square meters)    Stage 3B Moderate CKD (GFR = 30-44 mL/min/1 73 square meters)    Stage 4 Severe CKD (GFR = 15-29 mL/min/1 73 square meters)    Stage 5 End Stage CKD (GFR <15 mL/min/1 73 square meters)  Note: GFR calculation is accurate only with a steady state creatinine    CBC and differential [[id number]]  (Abnormal) Collected: 06/19/21 0217    Lab Status: Final result Specimen: Blood from Arm, Left Updated: 06/19/21 0222     WBC 8 96 Thousand/uL      RBC 6 25 Million/uL      Hemoglobin 16 1 g/dL      Hematocrit 50 6 %      MCV 81 fL      MCH 25 8 pg      MCHC 31 8 g/dL      RDW 13 8 %      MPV 10 4 fL      Platelets 416 Thousands/uL      nRBC 0 /100 WBCs      Neutrophils Relative 59 %      Immat GRANS % 0 %      Lymphocytes Relative 32 %      Monocytes Relative 8 %      Eosinophils Relative 1 %      Basophils Relative 0 %      Neutrophils Absolute 5 27 Thousands/µL      Immature Grans Absolute 0 03 Thousand/uL      Lymphocytes Absolute 2 82 Thousands/µL      Monocytes Absolute 0 74 Thousand/µL      Eosinophils Absolute 0 06 Thousand/µL      Basophils Absolute 0 04 Thousands/µL                  XR chest 2 views   ED Interpretation by Cristy Granados DO (06/19 5517)   NAD                 Procedures  ECG 12 Lead Documentation Only    Date/Time: 6/19/2021 1:17 AM  Performed by: Cristy Granados DO  Authorized by: Cristy Granados DO     Indications / Diagnosis:  Chest pain  Patient location:  ED  Previous ECG:     Previous ECG:  Compared to current    Similarity:  Changes noted  Interpretation:     Interpretation: non-specific    Rate:     ECG rate:  77    ECG rate assessment: normal    Rhythm:     Rhythm: sinus rhythm    Ectopy:     Ectopy: none    QRS:     QRS axis:  Normal    QRS intervals:  Normal  Conduction:     Conduction: normal    ST segments: ST segments:  Non-specific    Depression:  III  T waves:     T waves: inverted      Inverted:  III, V4 and V5             ED Course                             SBIRT 22yo+      Most Recent Value   SBIRT (22 yo +)   In order to provide better care to our patients, we are screening all of our patients for alcohol and drug use  Would it be okay to ask you these screening questions? No Filed at: 06/19/2021 0049                    [facility]  Number of Diagnoses or Management Options  Abnormal EKG: new and requires workup  Chest pain: new and requires workup  Diagnosis management comments: Patient presents for persistent chest pain  Evaluated by Cardiology for this and an abnormal EKG diagnosis  They were concerned for possible pericarditis and advised the patient to return to the emergency department if symptoms are persisting for further workup  Patient has been taking ibuprofen without relief  He again has an abnormal EKG which does not appear consistent with pericarditis or ischemia  Because of his persistent pain, recommendations by Cardiology and failure with NSAIDs I will perform another cardiac workup, chest x-ray and admit to Internal Medicine         Amount and/or Complexity of Data Reviewed  Clinical lab tests: ordered and reviewed  Tests in the radiology section of CPT®: ordered and reviewed  Tests in the medicine section of CPT®: reviewed and ordered  Review and summarize past medical records: yes  Independent visualization of images, tracings, or specimens: yes    Patient Progress  Patient progress: stable      Disposition  Final diagnoses:   Chest pain   Abnormal EKG     Time reflects when diagnosis was documented in both MDM as applicable and the Disposition within this note     Time User Action Codes Description Comment    6/19/2021  3:04 AM Paulette Gipson Add [R07 9] Chest pain     6/19/2021  3:04 AM Fransico Yoo Add [R94 31] Abnormal EKG       ED Disposition     ED Disposition Condition Date/Time Comment    Admit Stable Sat Jun 19, 2021  3:05 AM Case was discussed with NITESH and the patient's admission status was agreed to be Admission Status: observation status to the service of Dr Niall Hannah   Follow-up Information    None         Current Discharge Medication List      CONTINUE these medications which have NOT CHANGED    Details   ibuprofen (MOTRIN) 400 mg tablet Take 1 tablet (400 mg total) by mouth every 8 (eight) hours  Qty: 45 tablet, Refills: 0    Associated Diagnoses: Idiopathic pericarditis, unspecified chronicity; Costochondritis           No discharge procedures on file      PDMP Review     None          ED Provider  Electronically Signed by           Drake Howard DO  06/19/21 7462

## 2021-06-19 NOTE — UTILIZATION REVIEW
Initial Clinical Review    Admission: Date/Time/Statement:   Admission Orders (From admission, onward)     Ordered        06/19/21 0308  Place in Observation  Once                   Orders Placed This Encounter   Procedures    Place in Observation     Standing Status:   Standing     Number of Occurrences:   1     Order Specific Question:   Level of Care     Answer:   Med Surg [16]     ED Arrival Information     Expected Arrival Acuity    - 6/19/2021 00:32 Less Urgent         Means of arrival Escorted by Service Admission type    Walk-In Family Member Hospitalist Urgent         Arrival complaint    Chest Pain         Chief Complaint   Patient presents with    Chest Pain     mid to left cp ongoing intermittently for a month  worse with movement  Initial Presentation: 23 yo male to ED from home admitted observation d/t    Chest pain  Assessment & Plan  W/recent op cardiology concerning for possible pericarditis  Pt does have recent hx of strep pharyngitis treated w/IM PCN  No murmur appreciated   ekg nonspecific but w/new twi in lateral leads  Pt's cp is pleuritic and sometimes worse w/palpation  -will cycle troponins ekgs  Check ddimer given pleurisy and travel to [place]/in last 2 months  Will check covid  -consult cardiology given concern for chronic idiopathic pericarditis and consideration of further diagnostics     Anticipated Length of Stay:  Patient will be admitted on an Observation basis with an anticipated length of stay of  Less than 2 midnights     Justification for Hospital Stay: cp possible pericarditis      ED Triage Vitals   Temperature Pulse Respirations Blood Pressure SpO2   06/19/21 0047 06/19/21 0042 06/19/21 0042 06/19/21 0042 06/19/21 0042   98 8 °F (37 1 °C) 95 18 164/94 100 %      Temp Source Heart Rate Source Patient Position - Orthostatic VS BP Location FiO2 (%)   06/19/21 0047 06/19/21 0042 06/19/21 0042 06/19/21 0042 --   Oral Monitor Sitting Left arm       Pain Score 06/19/21 0042       5          Wt Readings from Last 1 Encounters:   06/08/21 87 6 kg (193 lb 3 2 oz)     Additional Vital Signs:   06/19/21 08:00:56  98 1 °F (36 7 °C)  56  16  131/80  97  98 %  --  --   06/19/21 0404  --  --  --  --  --  --  None (Room air)  --   06/19/21 03:47:31  98 1 °F (36 7 °C)  72  --  152/87  109  98 %  --  --   06/19/21 0221  --  76  18  158/92  --  100 %  None (Room air)  Sitting   06/19/21 0047  98 8 °F (37 1 °C)  --  --  --  --  --  --  --   06/19/21 0042  --  95  18  164/94  --  100 %  None (Room air)  Sitting       Pertinent Labs/Diagnostic Test Results:   Results from last 7 days   Lab Units 06/19/21  0709   SARS-COV-2  Positive*     Results from last 7 days   Lab Units 06/19/21  0217   WBC Thousand/uL 8 96   HEMOGLOBIN g/dL 16 1   HEMATOCRIT % 50 6*   PLATELETS Thousands/uL 248   NEUTROS ABS Thousands/µL 5 27         Results from last 7 days   Lab Units 06/19/21  0217   SODIUM mmol/L 143   POTASSIUM mmol/L 3 9   CHLORIDE mmol/L 105   CO2 mmol/L 32   ANION GAP mmol/L 6   BUN mg/dL 13   CREATININE mg/dL 1 13   EGFR ml/min/1 73sq m 92   CALCIUM mg/dL 9 6     Results from last 7 days   Lab Units 06/19/21  0217   AST U/L 16   ALT U/L 34   ALK PHOS U/L 106   TOTAL PROTEIN g/dL 8 5*   ALBUMIN g/dL 4 4   TOTAL BILIRUBIN mg/dL 0 19*         Results from last 7 days   Lab Units 06/19/21  0217   GLUCOSE RANDOM mg/dL 105       Results from last 7 days   Lab Units 06/19/21  0708 06/19/21  0217   TROPONIN I ng/mL <0 02 <0 02     Results from last 7 days   Lab Units 06/19/21  0708   D-DIMER QUANTITATIVE ug/ml FEU <0 27       Results from last 7 days   Lab Units 06/19/21  0217   NT-PRO BNP pg/mL 13     6/19 CXR:PENDING    6/19 EKG: Indications / Diagnosis:  Chest pain   Patient location:  ED   Previous ECG:     Previous ECG:  Compared to current     Similarity:  Changes noted   Interpretation:     Interpretation: non-specific     Rate:     ECG rate:  77     ECG rate assessment: normal     Rhythm:   Rhythm: sinus rhythm     Ectopy:     Ectopy: none     QRS:     QRS axis:  Normal     QRS intervals:  Normal   Conduction:     Conduction: normal     ST segments:     ST segments:  Non-specific     Depression:  III   T waves:     T waves: inverted       Inverted:  III, V4 and V5    6/19 EKG:  Sinus rhythm with sinus arrhythmia  Nonspecific T wave abnormality  Abnormal ECG  When compared with ECG of 01-MAY-2021 18:02,  Nonspecific T wave abnormality now evident in Lateral leads  Confirmed by Ammie Klinefelter ([id number]) on 6/19/2021 8:40:27 AM    ED Treatment:   Medication Administration from 06/19/2021 0032 to 06/19/2021 3320       Date/Time Order Dose Route Action Action by Comments     06/19/2021 0221 ketorolac (TORADOL) injection 15 mg 15 mg Intravenous Not Given                Admitting Diagnosis: Knee pain [M25 569]  Chest pain [R07 9]  Abnormal EKG [R94 31]  Age/Sex: 24 y o  male  Admission Orders:  PRN Meds:  ondansetron, 4 mg, Intravenous, Q6H PRN    CONTACT AND [employer]  OOB  TELE  REG DIET  AMBULATE      IP CONSULT TO CARDIOLOGY    Network Utilization Review Department  ATTENTION: Please call with any questions or concerns to [phone] and carefully listen to the prompts so that you are directed to the right person  All voicemails are confidential   Magaly Grand all requests for admission clinical reviews, approved or denied determinations and any other requests to dedicated fax number below belonging to the campus where the patient is receiving treatment   List of dedicated fax numbers for the Facilities:  [address] DENIALS (Administrative/Medical Necessity) [phone]   [address] (Maternity/NICU/Pediatrics) 261 [facility] [address] [place] 2000 [facility] [place] [address] Ollie Montoya [address] [facility] [address] [phone]

## 2021-06-19 NOTE — ASSESSMENT & PLAN NOTE
W/recent op cardiology concerning for possible pericarditis  Pt does have recent hx of strep pharyngitis treated w/IM PCN  No murmur appreciated   ekg nonspecific but w/new twi in lateral leads  Pt's cp is pleuritic and sometimes worse w/palpation  -will cycle troponins ekgs  Check ddimer given pleurisy and travel [place]/in last 2 months  Will check covid      -consult cardiology given concern for chronic idiopathic pericarditis and consideration of further diagnostics

## 2021-06-19 NOTE — NURSING NOTE
IV discontinued  Patient educated on COVID isolation guidelines  AVS reviewed with patient who denied any questions or concerns at time of discharge

## 2021-06-19 NOTE — PLAN OF CARE
Problem: PAIN - ADULT  Goal: Verbalizes/displays adequate comfort level or baseline comfort level  Description: Interventions:  - Encourage patient to monitor pain and request assistance  - Assess pain using appropriate pain scale  - Administer analgesics based on type and severity of pain and evaluate response  - Implement non-pharmacological measures as appropriate and evaluate response  - Consider cultural and social influences on pain and pain management  - Notify physician/advanced practitioner if interventions unsuccessful or patient reports new pain  Outcome: Progressing     Problem: INFECTION - ADULT  Goal: Absence or prevention of progression during hospitalization  Description: INTERVENTIONS:  - Assess and monitor for signs and symptoms of infection  - Monitor lab/diagnostic results  - Monitor all insertion sites, i e  indwelling lines, tubes, and drains  - Monitor endotracheal if appropriate and nasal secretions for changes in amount and color  - Eleele appropriate cooling/warming therapies per order  - Administer medications as ordered  - Instruct and encourage patient and family to use good hand hygiene technique  - Identify and instruct in appropriate isolation precautions for identified infection/condition  Outcome: Progressing     Problem: SAFETY ADULT  Goal: Patient will remain free of falls  Description: INTERVENTIONS:  - Educate patient/family on patient safety including physical limitations  - Instruct patient to call for assistance with activity   - Consult OT/PT to assist with strengthening/mobility   - Keep Call bell within reach  - Keep bed low and locked with side rails adjusted as appropriate  - Keep care items and personal belongings within reach  - Initiate and maintain comfort rounds  - Make Fall Risk Sign visible to staff  - Offer Toileting every  Hours, in advance of need  - Initiate/Maintain alarm  - Obtain necessary fall risk management equipment:   - Apply yellow socks and bracelet for high fall risk patients  - Consider moving patient to room near nurses station  Outcome: Progressing  Goal: Maintain or return to baseline ADL function  Description: INTERVENTIONS:  -  Assess patient's ability to carry out ADLs; assess patient's baseline for ADL function and identify physical deficits which impact ability to perform ADLs (bathing, care of mouth/teeth, toileting, grooming, dressing, etc )  - Assess/evaluate cause of self-care deficits   - Assess range of motion  - Assess patient's mobility; develop plan if impaired  - Assess patient's need for assistive devices and provide as appropriate  - Encourage maximum independence but intervene and supervise when necessary  - Involve family in performance of ADLs  - Assess for home care needs following discharge   - Consider OT consult to assist with ADL evaluation and planning for discharge  - Provide patient education as appropriate  Outcome: Progressing  Goal: Maintains/Returns to pre admission functional level  Description: INTERVENTIONS:  - Perform BMAT or MOVE assessment daily    - Set and communicate daily mobility goal to care team and patient/family/caregiver  - Collaborate with rehabilitation services on mobility goals if consulted  - Perform Range of Motion  times a day  - Reposition patient every  hours    - Dangle patient  times a day  - Stand patient  times a day  - Ambulate patient  times a day  - Out of bed to chair  times a day   - Out of bed for meals times a day  - Out of bed for toileting  - Record patient progress and toleration of activity level   Outcome: Progressing     Problem: DISCHARGE PLANNING  Goal: Discharge to home or other facility with appropriate resources  Description: INTERVENTIONS:  - Identify barriers to discharge w/patient and caregiver  - Arrange for needed discharge resources and transportation as appropriate  - Identify discharge learning needs (meds, wound care, etc )  - Arrange for interpretive services to assist at discharge as needed  - Refer to Case Management Department for coordinating discharge planning if the patient needs post-hospital services based on physician/advanced practitioner order or complex needs related to functional status, cognitive ability, or social support system  Outcome: Progressing     Problem: CARDIOVASCULAR - ADULT  Goal: Maintains optimal cardiac output and hemodynamic stability  Description: INTERVENTIONS:  - Monitor I/O, vital signs and rhythm  - Monitor for S/S and trends of decreased cardiac output  - Administer and titrate ordered vasoactive medications to optimize hemodynamic stability  - Assess quality of pulses, skin color and temperature  - Assess for signs of decreased coronary artery perfusion  - Instruct patient to report change in severity of symptoms  Outcome: Progressing  Goal: Absence of cardiac dysrhythmias or at baseline rhythm  Description: INTERVENTIONS:  - Continuous cardiac monitoring, vital signs, obtain 12 lead EKG if ordered  - Administer antiarrhythmic and heart rate control medications as ordered  - Monitor electrolytes and administer replacement therapy as ordered  Outcome: Progressing

## 2021-06-24 ENCOUNTER — HOSPITAL ENCOUNTER (EMERGENCY)
Facility: HOSPITAL | Age: 21
Discharge: HOME | End: 2021-06-24
Attending: EMERGENCY MEDICINE
Payer: COMMERCIAL

## 2021-06-24 ENCOUNTER — APPOINTMENT (EMERGENCY)
Dept: RADIOLOGY | Facility: HOSPITAL | Age: 21
End: 2021-06-24
Payer: COMMERCIAL

## 2021-06-24 VITALS
RESPIRATION RATE: 16 BRPM | SYSTOLIC BLOOD PRESSURE: 120 MMHG | OXYGEN SATURATION: 100 % | DIASTOLIC BLOOD PRESSURE: 78 MMHG | HEART RATE: 100 BPM | TEMPERATURE: 98.6 F

## 2021-06-24 DIAGNOSIS — U07.1 COVID-19: ICD-10-CM

## 2021-06-24 DIAGNOSIS — R06.00 DYSPNEA: Primary | ICD-10-CM

## 2021-06-24 DIAGNOSIS — R00.0 SINUS TACHYCARDIA: ICD-10-CM

## 2021-06-24 LAB
ALBUMIN SERPL BCP-MCNC: 5 G/DL (ref 3–5.2)
ALP SERPL-CCNC: 80 U/L (ref 43–122)
ALT SERPL W P-5'-P-CCNC: 31 U/L
ANION GAP SERPL CALCULATED.3IONS-SCNC: 20 MMOL/L (ref 5–14)
APTT PPP: 28 SECONDS (ref 23–37)
AST SERPL W P-5'-P-CCNC: 36 U/L (ref 17–59)
BACTERIA UR QL AUTO: NORMAL /HPF
BASOPHILS # BLD AUTO: 0.1 THOUSANDS/ΜL (ref 0–0.1)
BASOPHILS NFR BLD AUTO: 1 % (ref 0–1)
BILIRUB SERPL-MCNC: 0.48 MG/DL
BILIRUB UR QL STRIP: NEGATIVE
BUN SERPL-MCNC: 10 MG/DL (ref 5–25)
CALCIUM SERPL-MCNC: 10.1 MG/DL (ref 8.4–10.2)
CHLORIDE SERPL-SCNC: 102 MMOL/L (ref 97–108)
CLARITY UR: CLEAR
CO2 SERPL-SCNC: 19 MMOL/L (ref 22–30)
COLOR UR: ABNORMAL
CREAT SERPL-MCNC: 0.98 MG/DL (ref 0.7–1.5)
D DIMER PPP FEU-MCNC: 0.38 UG/ML FEU
EOSINOPHIL # BLD AUTO: 0.1 THOUSAND/ΜL (ref 0–0.4)
EOSINOPHIL NFR BLD AUTO: 1 % (ref 0–6)
ERYTHROCYTE [DISTWIDTH] IN BLOOD BY AUTOMATED COUNT: 14.4 %
GFR SERPL CREATININE-BSD FRML MDRD: 109 ML/MIN/1.73SQ M
GLUCOSE SERPL-MCNC: 130 MG/DL (ref 70–99)
GLUCOSE UR STRIP-MCNC: NEGATIVE MG/DL
HCT VFR BLD AUTO: 50 % (ref 41–53)
HGB BLD-MCNC: 16.1 G/DL (ref 13.5–17.5)
HGB UR QL STRIP.AUTO: 25
INR PPP: 1.15 (ref 0.84–1.19)
KETONES UR STRIP-MCNC: ABNORMAL MG/DL
LEUKOCYTE ESTERASE UR QL STRIP: NEGATIVE
LIPASE SERPL-CCNC: 83 U/L (ref 23–300)
LYMPHOCYTES # BLD AUTO: 2.8 THOUSANDS/ΜL (ref 0.5–4)
LYMPHOCYTES NFR BLD AUTO: 23 % (ref 25–45)
MCH RBC QN AUTO: 25.6 PG (ref 26–34)
MCHC RBC AUTO-ENTMCNC: 32.2 G/DL (ref 31–36)
MCV RBC AUTO: 79 FL (ref 80–100)
MONOCYTES # BLD AUTO: 0.8 THOUSAND/ΜL (ref 0.2–0.9)
MONOCYTES NFR BLD AUTO: 7 % (ref 1–10)
NEUTROPHILS # BLD AUTO: 8.1 THOUSANDS/ΜL (ref 1.8–7.8)
NEUTS SEG NFR BLD AUTO: 68 % (ref 45–65)
NITRITE UR QL STRIP: NEGATIVE
NON-SQ EPI CELLS URNS QL MICRO: NORMAL /HPF
PH UR STRIP.AUTO: 6 [PH]
PLATELET # BLD AUTO: 267 THOUSANDS/UL (ref 150–450)
PLATELET BLD QL SMEAR: ADEQUATE
PMV BLD AUTO: 8.8 FL (ref 8.9–12.7)
POTASSIUM SERPL-SCNC: 4.1 MMOL/L (ref 3.6–5)
PROT SERPL-MCNC: 8.8 G/DL (ref 5.9–8.4)
PROT UR STRIP-MCNC: ABNORMAL MG/DL
PROTHROMBIN TIME: 14.9 SECONDS (ref 11.6–14.5)
RBC # BLD AUTO: 6.31 MILLION/UL (ref 4.5–5.9)
RBC #/AREA URNS AUTO: NORMAL /HPF
RBC MORPH BLD: NORMAL
SODIUM SERPL-SCNC: 141 MMOL/L (ref 137–147)
SP GR UR STRIP.AUTO: 1.01 (ref 1–1.04)
TROPONIN I SERPL-MCNC: <0.01 NG/ML (ref 0–0.03)
UROBILINOGEN UA: NEGATIVE MG/DL
WBC # BLD AUTO: 11.9 THOUSAND/UL (ref 4.5–11)
WBC #/AREA URNS AUTO: NORMAL /HPF

## 2021-06-24 PROCEDURE — 71045 X-RAY EXAM CHEST 1 VIEW: CPT

## 2021-06-24 PROCEDURE — 81003 URINALYSIS AUTO W/O SCOPE: CPT | Performed by: PHYSICIAN ASSISTANT

## 2021-06-24 PROCEDURE — 85379 FIBRIN DEGRADATION QUANT: CPT | Performed by: PHYSICIAN ASSISTANT

## 2021-06-24 PROCEDURE — 84484 ASSAY OF TROPONIN QUANT: CPT | Performed by: PHYSICIAN ASSISTANT

## 2021-06-24 PROCEDURE — 85610 PROTHROMBIN TIME: CPT | Performed by: PHYSICIAN ASSISTANT

## 2021-06-24 PROCEDURE — 80053 COMPREHEN METABOLIC PANEL: CPT | Performed by: PHYSICIAN ASSISTANT

## 2021-06-24 PROCEDURE — 83690 ASSAY OF LIPASE: CPT | Performed by: PHYSICIAN ASSISTANT

## 2021-06-24 PROCEDURE — 93005 ELECTROCARDIOGRAM TRACING: CPT

## 2021-06-24 PROCEDURE — 85730 THROMBOPLASTIN TIME PARTIAL: CPT | Performed by: PHYSICIAN ASSISTANT

## 2021-06-24 PROCEDURE — 81001 URINALYSIS AUTO W/SCOPE: CPT | Performed by: PHYSICIAN ASSISTANT

## 2021-06-24 PROCEDURE — 73564 X-RAY EXAM KNEE 4 OR MORE: CPT

## 2021-06-24 PROCEDURE — 99285 EMERGENCY DEPT VISIT HI MDM: CPT

## 2021-06-24 PROCEDURE — 99285 EMERGENCY DEPT VISIT HI MDM: CPT | Performed by: PHYSICIAN ASSISTANT

## 2021-06-24 PROCEDURE — 85025 COMPLETE CBC W/AUTO DIFF WBC: CPT | Performed by: PHYSICIAN ASSISTANT

## 2021-06-24 PROCEDURE — 36415 COLL VENOUS BLD VENIPUNCTURE: CPT | Performed by: PHYSICIAN ASSISTANT

## 2021-06-24 RX ORDER — KETOROLAC TROMETHAMINE 30 MG/ML
15 INJECTION, SOLUTION INTRAMUSCULAR; INTRAVENOUS ONCE
Status: DISCONTINUED | OUTPATIENT
Start: 2021-06-24 | End: 2021-06-25 | Stop reason: HOSPADM

## 2021-06-24 RX ORDER — ONDANSETRON 2 MG/ML
4 INJECTION INTRAMUSCULAR; INTRAVENOUS ONCE
Status: DISCONTINUED | OUTPATIENT
Start: 2021-06-24 | End: 2021-06-25 | Stop reason: HOSPADM

## 2021-06-25 LAB
ATRIAL RATE: 134 BPM
P AXIS: 59 DEGREES
PR INTERVAL: 126 MS
QRS AXIS: 45 DEGREES
QRSD INTERVAL: 78 MS
QT INTERVAL: 302 MS
QTC INTERVAL: 450 MS
T WAVE AXIS: 42 DEGREES
VENTRICULAR RATE: 134 BPM

## 2021-06-25 PROCEDURE — 93010 ELECTROCARDIOGRAM REPORT: CPT | Performed by: INTERNAL MEDICINE

## 2021-06-25 NOTE — ED ATTENDING ATTESTATION
I was the attending physician on duty at the time the patient visited the emergency department  The patient was evaluated and dispositioned by the APC  I was personally available for consultation  I am administratively signing the chart after the fact      Junaid Davis MD

## 2021-06-25 NOTE — ED NOTES
Patient refused all medications states he is "just here to be checked out "      [facility]  06/24/21 5655

## 2021-06-25 NOTE — ED PROVIDER NOTES
History  Chief Complaint   Patient presents with    Shortness of Breath     pt was running home and became SOB, had headache and nausea  fell while running and hit left knee      25 yo M presenting for evaluation of multiple complaints  Pt recently tested positive for covid 4 days ago  Today he reports he was running home when he felt sob, dizzy and tripped and fell  Pt reports R knee pain from falling  Did not hit his head  No blurry vision, n/v/d, headache  No fevers, chills or sweats  Prior to Admission Medications   Prescriptions Last Dose Informant Patient Reported? Taking?   ibuprofen (MOTRIN) 400 mg tablet   No No   Sig: Take 1 tablet (400 mg total) by mouth every 8 (eight) hours      Facility-Administered Medications: None       History reviewed  No pertinent past medical history  History reviewed  No pertinent surgical history  History reviewed  No pertinent family history  I have reviewed and agree with the history as documented  E-Cigarette/Vaping     E-Cigarette/Vaping Substances     Social History     Tobacco Use    Smoking status: Never Smoker    Smokeless tobacco: Never Used   Substance Use Topics    Alcohol use: Yes     Comment: occasional    Drug use: Not Currently       Review of Systems   All other systems reviewed and are negative  Physical Exam  Physical Exam  Vitals and nursing note reviewed  Constitutional:       General: He is not in acute distress  Appearance: He is well-developed  He is not ill-appearing, toxic-appearing or diaphoretic  HENT:      Head: Normocephalic and atraumatic  Mouth/Throat:      Pharynx: No oropharyngeal exudate  Eyes:      Conjunctiva/sclera: Conjunctivae normal       Comments: EOM grossly intact   Neck:      Vascular: No JVD  Cardiovascular:      Rate and Rhythm: Tachycardia present  Pulmonary:      Effort: Pulmonary effort is normal       Breath sounds: No decreased breath sounds, wheezing, rhonchi or rales     Chest: Chest wall: No mass or tenderness  Abdominal:      Palpations: Abdomen is soft  Musculoskeletal:      Cervical back: Normal range of motion and neck supple  Comments: FROM, steady gait, cap refill brisk, strength and sensation grossly intact throughout   Skin:     General: Skin is warm and dry  Capillary Refill: Capillary refill takes less than 2 seconds  Neurological:      Mental Status: He is alert and oriented to person, place, and time     Psychiatric:         Behavior: Behavior normal          Vital Signs  ED Triage Vitals [06/24/21 2055]   Temperature Pulse Respirations Blood Pressure SpO2   98 6 °F (37 °C) (!) 115 16 133/70 97 %      Temp Source Heart Rate Source Patient Position - Orthostatic VS BP Location FiO2 (%)   Tympanic Monitor Lying Left arm --      Pain Score       --           Vitals:    06/24/21 2055 06/24/21 2158 06/24/21 2218   BP: 133/70 125/66 120/78   Pulse: (!) 115 100    Patient Position - Orthostatic VS: Lying  Lying         Visual Acuity      ED Medications  Medications   sodium chloride 0 9 % bolus 1,000 mL (1,000 mL Intravenous Not Given 6/24/21 2123)   ketorolac (TORADOL) injection 15 mg (15 mg Intravenous Not Given 6/24/21 2122)   ondansetron (ZOFRAN) injection 4 mg (4 mg Intravenous Not Given 6/24/21 2122)       Diagnostic Studies  Results Reviewed     Procedure Component Value Units Date/Time    Smear Review(Phlebs Do Not Order) [[id number]] Collected: 06/24/21 2118    Lab Status: Final result Specimen: Blood from Arm, Right Updated: 06/24/21 2205     RBC Morphology Normal     Platelet Estimate Adequate    Troponin I [[id number]]  (Normal) Collected: 06/24/21 2118    Lab Status: Final result Specimen: Blood from Arm, Right Updated: 06/24/21 2203     Troponin I <0 01 ng/mL     Lipase [[id number]]  (Normal) Collected: 06/24/21 2118    Lab Status: Final result Specimen: Blood from Arm, Right Updated: 06/24/21 2157     Lipase 83 u/L     Comprehensive metabolic panel [[id number]]  (Abnormal) Collected: 06/24/21 2118    Lab Status: Final result Specimen: Blood from Arm, Right Updated: 06/24/21 2157     Sodium 141 mmol/L      Potassium 4 1 mmol/L      Chloride 102 mmol/L      CO2 19 mmol/L      ANION GAP 20 mmol/L      BUN 10 mg/dL      Creatinine 0 98 mg/dL      Glucose 130 mg/dL      Calcium 10 1 mg/dL      AST 36 U/L      ALT 31 U/L      Alkaline Phosphatase 80 U/L      Total Protein 8 8 g/dL      Albumin 5 0 g/dL      Total Bilirubin 0 48 mg/dL      eGFR 109 ml/min/1 73sq m     Narrative:      [facility] guidelines for Chronic Kidney Disease (CKD):     Stage 1 with normal or high GFR (GFR > 90 mL/min/1 73 square meters)    Stage 2 Mild CKD (GFR = 60-89 mL/min/1 73 square meters)    Stage 3A Moderate CKD (GFR = 45-59 mL/min/1 73 square meters)    Stage 3B Moderate CKD (GFR = 30-44 mL/min/1 73 square meters)    Stage 4 Severe CKD (GFR = 15-29 mL/min/1 73 square meters)    Stage 5 End Stage CKD (GFR <15 mL/min/1 73 square meters)  Note: GFR calculation is accurate only with a steady state creatinine    D-Dimer [[id number]]  (Normal) Collected: 06/24/21 2118    Lab Status: Final result Specimen: Blood from Arm, Right Updated: 06/24/21 2155     D-Dimer, Quant 0 38 ug/ml FEU     Protime-INR [[id number]]  (Abnormal) Collected: 06/24/21 2118    Lab Status: Final result Specimen: Blood from Arm, Right Updated: 06/24/21 2153     Protime 14 9 seconds      INR 1 15    APTT [[id number]]  (Normal) Collected: 06/24/21 2118    Lab Status: Final result Specimen: Blood from Arm, Right Updated: 06/24/21 2153     PTT 28 seconds     Urine Microscopic [[id number]]  (Normal) Collected: 06/24/21 2118    Lab Status: Final result Specimen: Urine, Other Updated: 06/24/21 2152     RBC, UA 1-2 /hpf      WBC, UA None Seen /hpf      Epithelial Cells None Seen /hpf      Bacteria, UA None Seen /hpf     CBC and differential [[id number]]  (Abnormal) Collected: 06/24/21 7834    Lab Status: Final result Specimen: Blood from Arm, Right Updated: 06/24/21 2146     WBC 11 90 Thousand/uL      RBC 6 31 Million/uL      Hemoglobin 16 1 g/dL      Hematocrit 50 0 %      MCV 79 fL      MCH 25 6 pg      MCHC 32 2 g/dL      RDW 14 4 %      MPV 8 8 fL      Platelets 746 Thousands/uL      Neutrophils Relative 68 %      Lymphocytes Relative 23 %      Monocytes Relative 7 %      Eosinophils Relative 1 %      Basophils Relative 1 %      Neutrophils Absolute 8 10 Thousands/µL      Lymphocytes Absolute 2 80 Thousands/µL      Monocytes Absolute 0 80 Thousand/µL      Eosinophils Absolute 0 10 Thousand/µL      Basophils Absolute 0 10 Thousands/µL     UA w Reflex to Microscopic w Reflex to Culture [[id number]]  (Abnormal) Collected: 06/24/21 2118    Lab Status: Final result Specimen: Urine, Other Updated: 06/24/21 2145     Color, UA Straw     Clarity, UA Clear     Specific Gravity, UA 1 015     pH, UA 6 0     Leukocytes, UA Negative     Nitrite, UA Negative     Protein,  (2+) mg/dl      Glucose, UA Negative mg/dl      Ketones, UA 5 (Trace) mg/dl      Bilirubin, UA Negative     Blood, UA 25 0     UROBILINOGEN UA Negative mg/dL                  XR chest 1 view portable    (Results Pending)   XR knee 4+ vw left injury    (Results Pending)              Procedures  ECG 12 Lead Documentation Only    Date/Time: 6/24/2021 10:24 PM  Performed by: Juliane Paz PA-C  Authorized by: Juliane Paz PA-C     Indications / Diagnosis:  Sob, dizzy  Patient location:  ED  Interpretation:     Interpretation: normal    Rate:     ECG rate:  134    ECG rate assessment: tachycardic    Rhythm:     Rhythm: sinus tachycardia    QRS:     QRS axis:  Normal  Conduction:     Conduction: normal    ST segments:     ST segments:  Normal  T waves:     T waves: normal    Comments:      qtc 450, no STEMI             ED Course  ED Course as of Jun 24 2225   Thu Jun 24, 2021 2117 Pt stating that he doesn't want medication or IVF SBIRT 20yo+      Most Recent Value   SBIRT (24 yo +)   In order to provide better care to our patients, we are screening all of our patients for alcohol and drug use  Would it be okay to ask you these screening questions? Unable to answer at this time Filed at: 06/24/2021 2147                    MDM  Number of Diagnoses or Management Options  COVID-19  Dyspnea  Sinus tachycardia  Diagnosis management comments: 25 yo M presenting for evaluation of sob and dizziness after being diagnosed with covid, labs unremarkable, pt refusing any medication or IVF, only wanting labs and xrays, f/u with pcp as an outpatient    All labs and imaging discussed with patient, strict return to ED precautions discussed  Pt verbalizes understanding and agrees with plan  Pt is stable for discharge    Portions of the record may have been created with voice recognition software  Occasional wrong word or "sound a like" substitutions may have occurred due to the inherent limitations of voice recognition software  Read the chart carefully and recognize, using context, where substitutions have occurred  Disposition  Final diagnoses:   Dyspnea   COVID-19   Sinus tachycardia     Time reflects when diagnosis was documented in both MDM as applicable and the Disposition within this note     Time User Action Codes Description Comment    6/24/2021 10:16 PM Kd Meneses Add [R06 00] Dyspnea     6/24/2021 10:16 PM Kd Meneses Add [U07 1] COVID-19     6/24/2021 10:16 PM Kd Meneses Add [R00 0] Sinus tachycardia       ED Disposition     ED Disposition Condition Date/Time Comment    Discharge Stable Thu Jun 24, 2021 10:16 PM Kimber Tamayo discharge to home/self care              Follow-up Information     Follow up With Specialties Details Why Contact Info Additional [address] [facility] Osteopathy Medicine Call in 1 day  [address], [address] [facility] [address] [facility]  [address], [address], 1000 [facility], [employer], [place], [address] [facility] Emergency Department Emergency Medicine Go to  If symptoms worsen [address] [address] 0318 [facility] Emergency Department          Discharge Medication List as of 6/24/2021 10:17 PM      CONTINUE these medications which have NOT CHANGED    Details   ibuprofen (MOTRIN) 400 mg tablet Take 1 tablet (400 mg total) by mouth every 8 (eight) hours, Starting Tue 6/8/2021, Print           No discharge procedures on file      PDMP Review     None          ED Provider  Electronically Signed by           Tammy Kumar PA-C  06/24/21 3829

## 2021-07-18 ENCOUNTER — APPOINTMENT (EMERGENCY)
Dept: RADIOLOGY | Facility: HOSPITAL | Age: 21
End: 2021-07-18
Payer: COMMERCIAL

## 2021-07-18 ENCOUNTER — HOSPITAL ENCOUNTER (EMERGENCY)
Facility: HOSPITAL | Age: 21
Discharge: HOME | End: 2021-07-19
Attending: EMERGENCY MEDICINE | Admitting: EMERGENCY MEDICINE
Payer: COMMERCIAL

## 2021-07-18 ENCOUNTER — APPOINTMENT (EMERGENCY)
Dept: CT IMAGING | Facility: HOSPITAL | Age: 21
End: 2021-07-18
Payer: COMMERCIAL

## 2021-07-18 DIAGNOSIS — R00.2 PALPITATIONS: ICD-10-CM

## 2021-07-18 DIAGNOSIS — R07.9 CHEST PAIN: Primary | ICD-10-CM

## 2021-07-18 DIAGNOSIS — R94.31 ABNORMAL EKG: ICD-10-CM

## 2021-07-18 LAB
ALBUMIN SERPL BCP-MCNC: 4.5 G/DL (ref 3.5–5)
ALP SERPL-CCNC: 93 U/L (ref 46–116)
ALT SERPL W P-5'-P-CCNC: 35 U/L (ref 12–78)
ANION GAP SERPL CALCULATED.3IONS-SCNC: 6 MMOL/L (ref 4–13)
AST SERPL W P-5'-P-CCNC: 18 U/L (ref 5–45)
BASOPHILS # BLD AUTO: 0.05 THOUSANDS/ΜL (ref 0–0.1)
BASOPHILS NFR BLD AUTO: 1 % (ref 0–1)
BILIRUB SERPL-MCNC: 0.48 MG/DL (ref 0.2–1)
BUN SERPL-MCNC: 18 MG/DL (ref 5–25)
CALCIUM SERPL-MCNC: 8.9 MG/DL (ref 8.3–10.1)
CHLORIDE SERPL-SCNC: 102 MMOL/L (ref 100–108)
CO2 SERPL-SCNC: 30 MMOL/L (ref 21–32)
CREAT SERPL-MCNC: 1.12 MG/DL (ref 0.6–1.3)
D DIMER PPP FEU-MCNC: <0.27 UG/ML FEU
EOSINOPHIL # BLD AUTO: 0.12 THOUSAND/ΜL (ref 0–0.61)
EOSINOPHIL NFR BLD AUTO: 1 % (ref 0–6)
ERYTHROCYTE [DISTWIDTH] IN BLOOD BY AUTOMATED COUNT: 13.9 % (ref 11.6–15.1)
GFR SERPL CREATININE-BSD FRML MDRD: 93 ML/MIN/1.73SQ M
GLUCOSE SERPL-MCNC: 102 MG/DL (ref 65–140)
HCT VFR BLD AUTO: 49.5 % (ref 36.5–49.3)
HGB BLD-MCNC: 15.8 G/DL (ref 12–17)
IMM GRANULOCYTES # BLD AUTO: 0.01 THOUSAND/UL (ref 0–0.2)
IMM GRANULOCYTES NFR BLD AUTO: 0 % (ref 0–2)
LYMPHOCYTES # BLD AUTO: 2.65 THOUSANDS/ΜL (ref 0.6–4.47)
LYMPHOCYTES NFR BLD AUTO: 27 % (ref 14–44)
MCH RBC QN AUTO: 25.4 PG (ref 26.8–34.3)
MCHC RBC AUTO-ENTMCNC: 31.9 G/DL (ref 31.4–37.4)
MCV RBC AUTO: 80 FL (ref 82–98)
MONOCYTES # BLD AUTO: 0.68 THOUSAND/ΜL (ref 0.17–1.22)
MONOCYTES NFR BLD AUTO: 7 % (ref 4–12)
NEUTROPHILS # BLD AUTO: 6.18 THOUSANDS/ΜL (ref 1.85–7.62)
NEUTS SEG NFR BLD AUTO: 64 % (ref 43–75)
NRBC BLD AUTO-RTO: 0 /100 WBCS
PLATELET # BLD AUTO: 233 THOUSANDS/UL (ref 149–390)
PMV BLD AUTO: 9.9 FL (ref 8.9–12.7)
POTASSIUM SERPL-SCNC: 3.9 MMOL/L (ref 3.5–5.3)
PROT SERPL-MCNC: 8.5 G/DL (ref 6.4–8.2)
RBC # BLD AUTO: 6.22 MILLION/UL (ref 3.88–5.62)
SODIUM SERPL-SCNC: 138 MMOL/L (ref 136–145)
TROPONIN I SERPL-MCNC: <0.02 NG/ML
TSH SERPL DL<=0.05 MIU/L-ACNC: 2.12 UIU/ML (ref 0.36–3.74)
WBC # BLD AUTO: 9.69 THOUSAND/UL (ref 4.31–10.16)

## 2021-07-18 PROCEDURE — 99285 EMERGENCY DEPT VISIT HI MDM: CPT

## 2021-07-18 PROCEDURE — 85379 FIBRIN DEGRADATION QUANT: CPT | Performed by: PHYSICIAN ASSISTANT

## 2021-07-18 PROCEDURE — 84484 ASSAY OF TROPONIN QUANT: CPT | Performed by: PHYSICIAN ASSISTANT

## 2021-07-18 PROCEDURE — 71045 X-RAY EXAM CHEST 1 VIEW: CPT

## 2021-07-18 PROCEDURE — 93005 ELECTROCARDIOGRAM TRACING: CPT

## 2021-07-18 PROCEDURE — 84443 ASSAY THYROID STIM HORMONE: CPT | Performed by: PHYSICIAN ASSISTANT

## 2021-07-18 PROCEDURE — 85025 COMPLETE CBC W/AUTO DIFF WBC: CPT | Performed by: PHYSICIAN ASSISTANT

## 2021-07-18 PROCEDURE — 70490 CT SOFT TISSUE NECK W/O DYE: CPT

## 2021-07-18 PROCEDURE — 99285 EMERGENCY DEPT VISIT HI MDM: CPT | Performed by: PHYSICIAN ASSISTANT

## 2021-07-18 PROCEDURE — 80053 COMPREHEN METABOLIC PANEL: CPT | Performed by: PHYSICIAN ASSISTANT

## 2021-07-18 PROCEDURE — 36415 COLL VENOUS BLD VENIPUNCTURE: CPT | Performed by: PHYSICIAN ASSISTANT

## 2021-07-19 VITALS
RESPIRATION RATE: 18 BRPM | TEMPERATURE: 98.9 F | OXYGEN SATURATION: 97 % | WEIGHT: 199.08 LBS | HEART RATE: 66 BPM | DIASTOLIC BLOOD PRESSURE: 81 MMHG | SYSTOLIC BLOOD PRESSURE: 134 MMHG

## 2021-07-19 LAB
ATRIAL RATE: 68 BPM
P AXIS: 57 DEGREES
PR INTERVAL: 156 MS
QRS AXIS: 56 DEGREES
QRSD INTERVAL: 86 MS
QT INTERVAL: 384 MS
QTC INTERVAL: 408 MS
T WAVE AXIS: 26 DEGREES
VENTRICULAR RATE: 68 BPM

## 2021-07-19 PROCEDURE — 93010 ELECTROCARDIOGRAM REPORT: CPT | Performed by: INTERNAL MEDICINE

## 2021-07-22 NOTE — ED PROVIDER NOTES
History  Chief Complaint   Patient presents with    Shortness of Breath     Pt c/o neck pain, and SOB, reports seen at another ER yesterday for same  Patient is a 79-year-old male with no past medical history, presents emergency department for evaluation of chest pain, palpitations, shortness of breath and neck pain  Patient states he has had intermittent episodes of symptoms for the past month  Patient was seen at [facility] on 07/17, was found to have an abnormal EKG with nonspecific T-wave abnormality, had negative troponin, negative D-dimer  Patient was advised follow-up with Cardiology  Patient has not made an appointment with Cardiology  Patient states his symptoms are ongoing  Patient also reporting left-sided neck pain, feels a lump in his neck  Patient without fever, hemoptysis, cough, leg pain/swelling, recent travel, known recent surgery, recent injury/trauma, abdominal pain, drooling, difficulty swallowing, difficulty breathing  Prior to Admission Medications   Prescriptions Last Dose Informant Patient Reported? Taking?   ibuprofen (MOTRIN) 400 mg tablet   No No   Sig: Take 1 tablet (400 mg total) by mouth every 8 (eight) hours      Facility-Administered Medications: None       History reviewed  No pertinent past medical history  History reviewed  No pertinent surgical history  History reviewed  No pertinent family history  I have reviewed and agree with the history as documented  E-Cigarette/Vaping     E-Cigarette/Vaping Substances     Social History     Tobacco Use    Smoking status: Never Smoker    Smokeless tobacco: Never Used   Substance Use Topics    Alcohol use: Yes     Comment: occasional    Drug use: Not Currently       Review of Systems   Constitutional: Negative for chills and fever  HENT: Negative for congestion, ear pain and sore throat  Eyes: Negative for visual disturbance  Respiratory: Positive for shortness of breath   Negative for cough  Cardiovascular: Positive for chest pain and palpitations  Negative for leg swelling  Gastrointestinal: Negative for abdominal pain, diarrhea, nausea and vomiting  Genitourinary: Negative for dysuria and hematuria  Musculoskeletal: Positive for neck pain  Negative for back pain  Skin: Negative for rash  Neurological: Negative for dizziness, speech difficulty, weakness and headaches  Psychiatric/Behavioral: Negative for confusion  Physical Exam  Physical Exam  Constitutional:       General: He is not in acute distress  Appearance: He is well-developed  He is not ill-appearing, toxic-appearing or diaphoretic  HENT:      Head: Normocephalic and atraumatic  Right Ear: External ear normal       Left Ear: External ear normal       Nose: Nose normal       Mouth/Throat:      Lips: Pink  Mouth: Mucous membranes are moist    Eyes:      Extraocular Movements: Extraocular movements intact  Conjunctiva/sclera: Conjunctivae normal    Neck:     Cardiovascular:      Rate and Rhythm: Normal rate and regular rhythm  Pulmonary:      Effort: Pulmonary effort is normal  No tachypnea or respiratory distress  Breath sounds: Normal breath sounds  No decreased breath sounds, wheezing, rhonchi or rales  Musculoskeletal:      Cervical back: Normal range of motion and neck supple  Right lower leg: Normal  No swelling, tenderness or bony tenderness  No edema  Left lower leg: Normal  No swelling, tenderness or bony tenderness  No edema  Skin:     General: Skin is warm and dry  Capillary Refill: Capillary refill takes less than 2 seconds  Neurological:      Mental Status: He is alert and oriented to person, place, and time  GCS: GCS eye subscore is 4  GCS verbal subscore is 5  GCS motor subscore is 6     Psychiatric:         Mood and Affect: Mood and affect normal          Speech: Speech normal          Vital Signs  ED Triage Vitals   Temperature Pulse Respirations Blood Pressure SpO2   07/18/21 2051 07/18/21 2052 07/18/21 2052 07/18/21 2052 07/18/21 2052   98 9 °F (37 2 °C) 75 16 154/91 98 %      Temp Source Heart Rate Source Patient Position - Orthostatic VS BP Location FiO2 (%)   07/18/21 2051 07/18/21 2052 07/18/21 2052 07/18/21 2052 --   Oral Monitor Sitting Right arm       Pain Score       07/18/21 2052       8           Vitals:    07/18/21 2052 07/18/21 2300 07/19/21 0145   BP: 154/91 144/75 134/81   Pulse: 75 64 66   Patient Position - Orthostatic VS: Sitting Lying Sitting         Visual Acuity      ED Medications  Medications - No data to display    Diagnostic Studies  Results Reviewed     Procedure Component Value Units Date/Time    D-Dimer [[id number]]  (Normal) Collected: 07/18/21 2200    Lab Status: Final result Specimen: Blood from Arm, Left Updated: 07/18/21 2235     D-Dimer, Quant <0 27 ug/ml FEU     TSH [[id number]]  (Normal) Collected: 07/18/21 2200    Lab Status: Final result Specimen: Blood from Arm, Left Updated: 07/18/21 2233     TSH 3RD GENERATON 2 120 uIU/mL     Narrative:      Patients undergoing fluorescein dye angiography may retain small amounts of fluorescein in the body for 48-72 hours post procedure  Samples containing fluorescein can produce falsely depressed TSH values  If the patient had this procedure,a specimen should be resubmitted post fluorescein clearance        Troponin I [[id number]]  (Normal) Collected: 07/18/21 2200    Lab Status: Final result Specimen: Blood from Arm, Left Updated: 07/18/21 2226     Troponin I <0 02 ng/mL     Comprehensive metabolic panel [[id number]]  (Abnormal) Collected: 07/18/21 2200    Lab Status: Final result Specimen: Blood from Arm, Left Updated: 07/18/21 2224     Sodium 138 mmol/L      Potassium 3 9 mmol/L      Chloride 102 mmol/L      CO2 30 mmol/L      ANION GAP 6 mmol/L      BUN 18 mg/dL      Creatinine 1 12 mg/dL      Glucose 102 mg/dL      Calcium 8 9 mg/dL      AST 18 U/L      ALT 35 U/L      Alkaline Phosphatase 93 U/L      Total Protein 8 5 g/dL      Albumin 4 5 g/dL      Total Bilirubin 0 48 mg/dL      eGFR 93 ml/min/1 73sq m     Narrative:      [facility] guidelines for Chronic Kidney Disease (CKD):     Stage 1 with normal or high GFR (GFR > 90 mL/min/1 73 square meters)    Stage 2 Mild CKD (GFR = 60-89 mL/min/1 73 square meters)    Stage 3A Moderate CKD (GFR = 45-59 mL/min/1 73 square meters)    Stage 3B Moderate CKD (GFR = 30-44 mL/min/1 73 square meters)    Stage 4 Severe CKD (GFR = 15-29 mL/min/1 73 square meters)    Stage 5 End Stage CKD (GFR <15 mL/min/1 73 square meters)  Note: GFR calculation is accurate only with a steady state creatinine    CBC and differential [[id number]]  (Abnormal) Collected: 07/18/21 2200    Lab Status: Final result Specimen: Blood from Arm, Left Updated: 07/18/21 2206     WBC 9 69 Thousand/uL      RBC 6 22 Million/uL      Hemoglobin 15 8 g/dL      Hematocrit 49 5 %      MCV 80 fL      MCH 25 4 pg      MCHC 31 9 g/dL      RDW 13 9 %      MPV 9 9 fL      Platelets 677 Thousands/uL      nRBC 0 /100 WBCs      Neutrophils Relative 64 %      Immat GRANS % 0 %      Lymphocytes Relative 27 %      Monocytes Relative 7 %      Eosinophils Relative 1 %      Basophils Relative 1 %      Neutrophils Absolute 6 18 Thousands/µL      Immature Grans Absolute 0 01 Thousand/uL      Lymphocytes Absolute 2 65 Thousands/µL      Monocytes Absolute 0 68 Thousand/µL      Eosinophils Absolute 0 12 Thousand/µL      Basophils Absolute 0 05 Thousands/µL                  CT soft tissue neck wo contrast   Final Result by Delphine Serrano MD (07/19 8192)      No acute findings within limitations of noncontrast exam       No interval change compared to prior study dated 5/3/2021  Workstation performed: [id number]         XR chest 1 view portable   Final Result by Frances Trejo MD (07/19 8053)      No acute cardiopulmonary disease                    Workstation performed: [id number]                    Procedures  Procedures         ED Course  ED Course as of Jul 21 2155   Lubna Camarillo Jul 18, 2021   2354 Patient refusing IV contrast, explained will limit study and may not be able to see what could be causing his pain, patient understands and continues to refuse, will obtain dry scan                                SBIRT 20yo+      Most Recent Value   SBIRT (25 yo +)   In order to provide better care to our patients, we are screening all of our patients for alcohol and drug use  Would it be okay to ask you these screening questions? No Filed at: 07/18/2021 2057                    MDM  Number of Diagnoses or Management Options  Abnormal EKG: new and requires workup  Chest pain: new and does not require workup  Palpitations: new and does not require workup  Diagnosis management comments: Patient is a 69-year-old male with no past medical history, presents emergency department for evaluation of chest pain, palpitations, shortness of breath and neck pain  Patient states he has had intermittent episodes of symptoms for the past month  Patient was seen at [facility] on 07/17, was found to have an abnormal EKG with nonspecific T-wave abnormality, had negative troponin, negative D-dimer  Patient was advised follow-up with Cardiology  Patient has not made an appointment with Cardiology  Patient states his symptoms are ongoing  Patient also reporting left-sided neck pain, feels a lump in his neck  EKG with non-specific T wave abnormality - similar to [facility] yesterday  Chest x-ray shows no acute cardiopulmonary disease seen by me  Troponin negative, D-dimer negative  TSH within normal limits, lab work without significant abnormality  CT neck shows No acute findings within limitations of noncontrast exam   No interval change compared to prior study dated 5/3/2021  Discussed all results with patient at bedside    Stressed importance of follow-up with Cardiology if patient's symptoms are continuing, may benefit from Holter monitor given palpitations  Return precautions discussed with patient  Patient verbalizes understanding and agrees with plan  The management plan was discussed in detail with the patient at bedside and all questions were answered  Prior to discharge, I provided both verbal and written instructions  I discussed with the patient the signs and symptoms for which to return to the emergency department  All questions were answered and patient was comfortable with the plan of care and discharged to home  The patient agrees to return to the Emergency Department for concerns and/or progression of illness  Disposition  Final diagnoses:   Chest pain   Abnormal EKG   Palpitations     Time reflects when diagnosis was documented in both MDM as applicable and the Disposition within this note     Time User Action Codes Description Comment    7/19/2021  2:06 AM Aviva Amsler Add [R07 89] Other chest pain     7/19/2021  2:06 AM Aviva Amsler Remove [R07 89] Other chest pain     7/19/2021  2:06 AM Aviva Amsler Add [R07 9] Chest pain     7/19/2021  2:06 AM Aviva Amsler Add [R94 31] Abnormal EKG     7/19/2021  2:06 AM Aviva Amsler Add [R00 2] Palpitations       ED Disposition     ED Disposition Condition Date/Time Comment    Discharge Stable Mon Jul 19, 2021  2:06 AM Patricia Louis discharge to home/self care              Follow-up Information     Follow up With Specialties Details Why Contact Info [facility] Schedule an appointment as soon as possible for a visit in 1 day  [address] [address]  [address] [place], [place], [address] [phone]          Discharge Medication List as of 7/19/2021  2:06 AM      CONTINUE these medications which have NOT CHANGED    Details   ibuprofen (MOTRIN) 400 mg tablet Take 1 tablet (400 mg total) by mouth every 8 (eight) hours, Starting Tue 6/8/2021, Print           No discharge procedures on file      PDMP Review     None          ED Provider  Electronically Signed by           Randolph Cruz PA-C  07/21/21 9908

## 2021-08-07 ENCOUNTER — HOSPITAL ENCOUNTER (EMERGENCY)
Facility: HOSPITAL | Age: 21
Discharge: HOME | End: 2021-08-07
Attending: EMERGENCY MEDICINE | Admitting: EMERGENCY MEDICINE
Payer: COMMERCIAL

## 2021-08-07 VITALS
TEMPERATURE: 98.1 F | WEIGHT: 186.29 LBS | RESPIRATION RATE: 16 BRPM | SYSTOLIC BLOOD PRESSURE: 141 MMHG | HEART RATE: 83 BPM | DIASTOLIC BLOOD PRESSURE: 84 MMHG | OXYGEN SATURATION: 96 %

## 2021-08-07 DIAGNOSIS — R07.89 NON-CARDIAC CHEST PAIN: Primary | ICD-10-CM

## 2021-08-07 PROCEDURE — 99285 EMERGENCY DEPT VISIT HI MDM: CPT

## 2021-08-07 PROCEDURE — 93005 ELECTROCARDIOGRAM TRACING: CPT

## 2021-08-07 PROCEDURE — 99284 EMERGENCY DEPT VISIT MOD MDM: CPT | Performed by: EMERGENCY MEDICINE

## 2021-08-07 RX ORDER — NAPROXEN 500 MG/1
500 TABLET ORAL 2 TIMES DAILY PRN
Qty: 14 TABLET | Refills: 0 | Status: SHIPPED | OUTPATIENT
Start: 2021-08-07

## 2021-08-07 RX ORDER — KETOROLAC TROMETHAMINE 30 MG/ML
15 INJECTION, SOLUTION INTRAMUSCULAR; INTRAVENOUS ONCE
Status: DISCONTINUED | OUTPATIENT
Start: 2021-08-07 | End: 2021-08-07

## 2021-08-07 NOTE — DISCHARGE INSTRUCTIONS
Costocondritis  LO QUE NECESITAS SABER:  La costocondritis es ny afección que causa dolor en el cartílago que conecta shannan costillas al esternón (Catrachito Beltran)  El cartílago es el tejido resistente y flexible que protege los [place]  INSTRUCCIONES DE DESCARGA:  Medicamentos  Acetaminofeno: checo medicamento disminuye el dolor  [address]  El acetaminofeno puede causar daño al hígado si no se bebeto correctamente  [place], teresa el ibuprofeno, ayudan a disminuir la hinchazón, el dolor y la fiebre  Los MARK pueden causar sangrado estomacal o problemas renales en ciertas personas  Si bebeto medicamentos anticoagulantes, siempre pregunte si los MARK son seguros para usted  Calor: El calor ayuda a disminuir el dolor en algunos pacientes  Aplique calor en el área precious 20 a 30 minutos cada 2 horas precious los días que se le indiquen  Hielo: El hielo ayuda a disminuir la hinchazón y el dolor  El hielo también puede ayudar a prevenir daños en los tejidos  Use ny bolsa de hielo o ponga hielo faiza en ny bolsa de plástico  Gerlene Jhony con ny toalla y colóquelo en el área dolorida precoius 15 a 20 minutos [address] indicaciones  Ejercicios de estiramiento: El estiramiento suave puede ayudar a shannan síntomas  Párese en ny hugo y ponga shannan obdulia en el buddy de la hugo al nivel de shannan orejas u hombros  Da un paso adelante y estira suavemente tu pecho  Intenta esto con tus obdulia más arriba en la hugo  Comuníquese con lynn proveedor de atención médica si:  Tienes fiebre  Lewis  dolorosas de lynn pecho se francisco javier inflamadas, enrojecidas y se sienten cálidas al tacto  No puedes dormir por el dolor  Tiene preguntas o inquietudes sobre lynn condición o cuidado
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
## 2021-08-08 LAB
ATRIAL RATE: 60 BPM
P AXIS: 63 DEGREES
PR INTERVAL: 154 MS
QRS AXIS: 60 DEGREES
QRSD INTERVAL: 86 MS
QT INTERVAL: 408 MS
QTC INTERVAL: 408 MS
T WAVE AXIS: 34 DEGREES
VENTRICULAR RATE: 60 BPM

## 2021-08-08 PROCEDURE — 93010 ELECTROCARDIOGRAM REPORT: CPT | Performed by: INTERNAL MEDICINE

## 2021-10-03 ENCOUNTER — APPOINTMENT (EMERGENCY)
Dept: RADIOLOGY | Facility: HOSPITAL | Age: 21
End: 2021-10-03
Payer: MEDICARE

## 2021-10-03 ENCOUNTER — HOSPITAL ENCOUNTER (EMERGENCY)
Facility: HOSPITAL | Age: 21
Discharge: HOME | End: 2021-10-03
Attending: EMERGENCY MEDICINE
Payer: MEDICARE

## 2021-10-03 VITALS
HEART RATE: 66 BPM | OXYGEN SATURATION: 100 % | TEMPERATURE: 98.1 F | SYSTOLIC BLOOD PRESSURE: 136 MMHG | RESPIRATION RATE: 18 BRPM | DIASTOLIC BLOOD PRESSURE: 92 MMHG

## 2021-10-03 DIAGNOSIS — R51.9 HEADACHE: Primary | ICD-10-CM

## 2021-10-03 DIAGNOSIS — R07.89 ATYPICAL CHEST PAIN: ICD-10-CM

## 2021-10-03 LAB
ATRIAL RATE: 51 BPM
P AXIS: 71 DEGREES
PR INTERVAL: 152 MS
QRS AXIS: 49 DEGREES
QRSD INTERVAL: 84 MS
QT INTERVAL: 452 MS
QTC INTERVAL: 416 MS
T WAVE AXIS: 16 DEGREES
VENTRICULAR RATE: 51 BPM

## 2021-10-03 PROCEDURE — 93005 ELECTROCARDIOGRAM TRACING: CPT

## 2021-10-03 PROCEDURE — 99284 EMERGENCY DEPT VISIT MOD MDM: CPT | Performed by: EMERGENCY MEDICINE

## 2021-10-03 PROCEDURE — 71045 X-RAY EXAM CHEST 1 VIEW: CPT

## 2021-10-03 PROCEDURE — 99285 EMERGENCY DEPT VISIT HI MDM: CPT

## 2021-10-03 PROCEDURE — 93010 ELECTROCARDIOGRAM REPORT: CPT | Performed by: INTERNAL MEDICINE

## 2022-09-30 ENCOUNTER — HOSPITAL ENCOUNTER (EMERGENCY)
Facility: HOSPITAL | Age: 22
Discharge: HOME | End: 2022-09-30
Attending: EMERGENCY MEDICINE
Payer: COMMERCIAL

## 2022-09-30 ENCOUNTER — APPOINTMENT (OUTPATIENT)
Dept: RADIOLOGY | Facility: HOSPITAL | Age: 22
End: 2022-09-30
Payer: COMMERCIAL

## 2022-09-30 VITALS
RESPIRATION RATE: 18 BRPM | SYSTOLIC BLOOD PRESSURE: 132 MMHG | OXYGEN SATURATION: 99 % | HEART RATE: 65 BPM | TEMPERATURE: 97.8 F | DIASTOLIC BLOOD PRESSURE: 74 MMHG

## 2022-09-30 DIAGNOSIS — M25.562 ACUTE PAIN OF LEFT KNEE: ICD-10-CM

## 2022-09-30 DIAGNOSIS — S80.212A ABRASION OF LEFT KNEE, INITIAL ENCOUNTER: ICD-10-CM

## 2022-09-30 DIAGNOSIS — M25.512 ACUTE PAIN OF LEFT SHOULDER: ICD-10-CM
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
## 2022-09-30 PROCEDURE — 99284 EMERGENCY DEPT VISIT MOD MDM: CPT | Performed by: PHYSICIAN ASSISTANT

## 2022-09-30 PROCEDURE — 99284 EMERGENCY DEPT VISIT MOD MDM: CPT

## 2022-09-30 PROCEDURE — 73030 X-RAY EXAM OF SHOULDER: CPT

## 2022-09-30 PROCEDURE — 73564 X-RAY EXAM KNEE 4 OR MORE: CPT

## 2022-09-30 RX ORDER — ACETAMINOPHEN 325 MG/1
975 TABLET ORAL ONCE
Status: COMPLETED | OUTPATIENT
Start: 2022-09-30 | End: 2022-09-30

## 2022-09-30 RX ORDER — KETOROLAC TROMETHAMINE 30 MG/ML
15 INJECTION, SOLUTION INTRAMUSCULAR; INTRAVENOUS ONCE
Status: DISCONTINUED | OUTPATIENT
Start: 2022-09-30 | End: 2022-09-30 | Stop reason: HOSPADM

## 2022-09-30 RX ORDER — METHOCARBAMOL 500 MG/1
500 TABLET, FILM COATED ORAL 2 TIMES DAILY
Qty: 20 TABLET | Refills: 0 | Status: SHIPPED | OUTPATIENT
Start: 2022-09-30

## 2022-09-30 RX ADMIN — ACETAMINOPHEN 975 MG: 325 TABLET ORAL at 03:27
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
## 2022-11-26 ENCOUNTER — HOSPITAL ENCOUNTER (EMERGENCY)
Facility: HOSPITAL | Age: 22
Discharge: HOME | End: 2022-11-26
Attending: EMERGENCY MEDICINE

## 2022-11-26 ENCOUNTER — APPOINTMENT (EMERGENCY)
Dept: RADIOLOGY | Facility: HOSPITAL | Age: 22
End: 2022-11-26

## 2022-11-26 VITALS
SYSTOLIC BLOOD PRESSURE: 125 MMHG | RESPIRATION RATE: 16 BRPM | TEMPERATURE: 97.7 F | DIASTOLIC BLOOD PRESSURE: 84 MMHG | HEART RATE: 65 BPM | WEIGHT: 189.6 LBS | OXYGEN SATURATION: 100 %

## 2022-11-26 DIAGNOSIS — M79.10 MYALGIA: ICD-10-CM

## 2022-11-26 DIAGNOSIS — R11.0 NAUSEA: ICD-10-CM

## 2022-11-26 DIAGNOSIS — R07.89 CHEST TIGHTNESS: ICD-10-CM

## 2022-11-26 DIAGNOSIS — R51.9 HEADACHE: ICD-10-CM

## 2022-11-26 DIAGNOSIS — Z20.822 PERSON UNDER INVESTIGATION FOR COVID-19: Primary | ICD-10-CM

## 2022-11-26 LAB
ATRIAL RATE: 71 BPM
P AXIS: 60 DEGREES
PR INTERVAL: 160 MS
QRS AXIS: 45 DEGREES
QRSD INTERVAL: 92 MS
QT INTERVAL: 384 MS
QTC INTERVAL: 417 MS
T WAVE AXIS: 0 DEGREES
VENTRICULAR RATE: 71 BPM

## 2022-11-26 RX ORDER — ONDANSETRON 4 MG/1
4 TABLET, ORALLY DISINTEGRATING ORAL ONCE
Status: COMPLETED | OUTPATIENT
Start: 2022-11-26 | End: 2022-11-26

## 2022-11-26 RX ORDER — KETOROLAC TROMETHAMINE 30 MG/ML
15 INJECTION, SOLUTION INTRAMUSCULAR; INTRAVENOUS ONCE
Status: DISCONTINUED | OUTPATIENT
Start: 2022-11-26 | End: 2022-11-26 | Stop reason: HOSPADM

## 2022-11-26 RX ADMIN — ONDANSETRON 4 MG: 4 TABLET, ORALLY DISINTEGRATING ORAL at 15:39
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
## 2022-11-28 LAB
FLUAV RNA RESP QL NAA+PROBE: NEGATIVE
FLUBV RNA RESP QL NAA+PROBE: NEGATIVE
SARS-COV-2 RNA RESP QL NAA+PROBE: NEGATIVE

## 2024-02-12 ENCOUNTER — HOSPITAL ENCOUNTER (EMERGENCY)
Facility: HOSPITAL | Age: 24
Discharge: HOME | End: 2024-02-12
Attending: EMERGENCY MEDICINE
Payer: COMMERCIAL

## 2024-02-12 ENCOUNTER — APPOINTMENT (EMERGENCY)
Dept: RADIOLOGY | Facility: HOSPITAL | Age: 24
End: 2024-02-12
Payer: COMMERCIAL

## 2024-02-12 VITALS
HEART RATE: 78 BPM | DIASTOLIC BLOOD PRESSURE: 74 MMHG | OXYGEN SATURATION: 99 % | RESPIRATION RATE: 18 BRPM | TEMPERATURE: 97.7 F | SYSTOLIC BLOOD PRESSURE: 124 MMHG | WEIGHT: 195.55 LBS

## 2024-02-12 DIAGNOSIS — J11.1 INFLUENZA-LIKE ILLNESS: Primary | ICD-10-CM

## 2024-02-12 LAB
FLUAV RNA RESP QL NAA+PROBE: NEGATIVE
FLUBV RNA RESP QL NAA+PROBE: NEGATIVE
RSV RNA RESP QL NAA+PROBE: NEGATIVE
SARS-COV-2 RNA RESP QL NAA+PROBE: NEGATIVE

## 2024-02-12 PROCEDURE — 99285 EMERGENCY DEPT VISIT HI MDM: CPT

## 2024-02-12 PROCEDURE — 93005 ELECTROCARDIOGRAM TRACING: CPT

## 2024-02-12 PROCEDURE — 0241U HB NFCT DS VIR RESP RNA 4 TRGT: CPT | Performed by: EMERGENCY MEDICINE

## 2024-02-12 PROCEDURE — 71046 X-RAY EXAM CHEST 2 VIEWS: CPT

## 2024-02-12 PROCEDURE — 99284 EMERGENCY DEPT VISIT MOD MDM: CPT | Performed by: EMERGENCY MEDICINE

## 2024-02-12 RX ORDER — ACETAMINOPHEN 325 MG/1
650 TABLET ORAL ONCE
Status: COMPLETED | OUTPATIENT
Start: 2024-02-12 | End: 2024-02-12

## 2024-02-12 RX ORDER — METOCLOPRAMIDE 10 MG/1
10 TABLET ORAL EVERY 6 HOURS PRN
Qty: 8 TABLET | Refills: 0 | Status: SHIPPED | OUTPATIENT
Start: 2024-02-12

## 2024-02-12 RX ORDER — KETOROLAC TROMETHAMINE 30 MG/ML
15 INJECTION, SOLUTION INTRAMUSCULAR; INTRAVENOUS ONCE
Status: DISCONTINUED | OUTPATIENT
Start: 2024-02-12 | End: 2024-02-13 | Stop reason: HOSPADM

## 2024-02-12 RX ORDER — NAPROXEN 250 MG/1
250 TABLET ORAL
Qty: 21 TABLET | Refills: 0 | Status: SHIPPED | OUTPATIENT
Start: 2024-02-12 | End: 2024-02-19

## 2024-02-12 RX ADMIN — ACETAMINOPHEN 325MG 650 MG: 325 TABLET ORAL at 20:48

## 2024-02-12 NOTE — Clinical Note
Emanuel Cameron was seen and treated in our emergency department on 2/12/2024.    No restrictions            Diagnosis:     Emanuel  may return to work on return date.    He may return on this date: 02/14/2024         If you have any questions or concerns, please don't hesitate to call.      Yash Rendon MD    ______________________________           _______________          _______________  Hospital Representative                              Date                                Time

## 2024-02-13 LAB
ATRIAL RATE: 65 BPM
P AXIS: 58 DEGREES
PR INTERVAL: 162 MS
QRS AXIS: 43 DEGREES
QRSD INTERVAL: 94 MS
QT INTERVAL: 402 MS
QTC INTERVAL: 418 MS
T WAVE AXIS: -18 DEGREES
VENTRICULAR RATE: 65 BPM

## 2024-02-13 PROCEDURE — 93010 ELECTROCARDIOGRAM REPORT: CPT | Performed by: INTERNAL MEDICINE

## 2024-02-13 NOTE — ED PROVIDER NOTES
History  Chief Complaint   Patient presents with    Headache     Pt c/o headache, epigastric pain, nausea and diarrhea x3 days. Pt also reports that when he stretches his chest hurts       History provided by:  Patient   used: Yes    Flu Symptoms  Presenting symptoms: diarrhea, fatigue, headache, myalgias, nausea and vomiting    Presenting symptoms: no cough, no rhinorrhea and no shortness of breath    Presenting symptoms comment:  Chest pain that is worse with stretching/bending/twisting  Severity:  Moderate  Onset quality:  Gradual  Duration:  3 days  Progression:  Worsening  Chronicity:  New  Relieved by:  Nothing  Worsened by:  Nothing  Associated symptoms: nasal congestion    Associated symptoms: no mental status change and no neck stiffness        Prior to Admission Medications   Prescriptions Last Dose Informant Patient Reported? Taking?   methocarbamol (ROBAXIN) 500 mg tablet   No No   Sig: Take 1 tablet (500 mg total) by mouth 2 (two) times a day      Facility-Administered Medications: None       Past Medical History:   Diagnosis Date    Pericarditis        History reviewed. No pertinent surgical history.    History reviewed. No pertinent family history.  I have reviewed and agree with the history as documented.    E-Cigarette/Vaping    E-Cigarette Use Never User      E-Cigarette/Vaping Substances     Social History     Tobacco Use    Smoking status: Never    Smokeless tobacco: Never   Vaping Use    Vaping status: Never Used   Substance Use Topics    Alcohol use: Yes     Comment: occasional    Drug use: Not Currently       Review of Systems   Constitutional:  Positive for fatigue.   HENT:  Positive for congestion. Negative for dental problem and rhinorrhea.    Respiratory:  Negative for cough, chest tightness, shortness of breath and wheezing.    Gastrointestinal:  Positive for diarrhea, nausea and vomiting.   Musculoskeletal:  Positive for myalgias. Negative for neck stiffness.    Neurological:  Positive for headaches.       Physical Exam  Physical Exam  Vitals and nursing note reviewed.   Constitutional:       Appearance: He is well-developed.   HENT:      Nose: Congestion present.      Mouth/Throat:      Pharynx: No oropharyngeal exudate.   Eyes:      Conjunctiva/sclera: Conjunctivae normal.   Neck:      Comments: No meningeal signs  Cardiovascular:      Rate and Rhythm: Regular rhythm.      Heart sounds: Normal heart sounds.   Pulmonary:      Effort: Pulmonary effort is normal. No respiratory distress.      Breath sounds: Normal breath sounds. No wheezing or rales.   Chest:      Chest wall: No tenderness.   Abdominal:      General: Bowel sounds are normal. There is no distension.      Palpations: Abdomen is soft. There is no mass.      Tenderness: There is no abdominal tenderness.      Hernia: No hernia is present.      Comments: No cvat   Musculoskeletal:         General: Normal range of motion.      Cervical back: Normal range of motion and neck supple.   Lymphadenopathy:      Cervical: No cervical adenopathy.   Skin:     Findings: No erythema or rash.      Comments: No edema   Neurological:      Mental Status: He is alert and oriented to person, place, and time.      Cranial Nerves: No cranial nerve deficit.   Psychiatric:         Behavior: Behavior normal.         Vital Signs  ED Triage Vitals   Temperature Pulse Respirations Blood Pressure SpO2   02/12/24 1921 02/12/24 1921 02/12/24 1919 02/12/24 1921 02/12/24 1921   97.7 °F (36.5 °C) 80 16 157/93 100 %      Temp Source Heart Rate Source Patient Position - Orthostatic VS BP Location FiO2 (%)   02/12/24 1921 02/12/24 1919 02/12/24 1921 02/12/24 1921 --   Oral Monitor Sitting Right arm       Pain Score       02/12/24 2048       6           Vitals:    02/12/24 1921 02/12/24 2127   BP: 157/93 124/74   Pulse: 80 78   Patient Position - Orthostatic VS: Sitting Lying         Visual Acuity  Visual Acuity      Flowsheet Row Most Recent Value    L Pupil Size (mm) 3   R Pupil Size (mm) 3            ED Medications  Medications   ketorolac (TORADOL) injection 15 mg (0 mg Intramuscular Hold 2/12/24 2057)   acetaminophen (TYLENOL) tablet 650 mg (650 mg Oral Given 2/12/24 2048)       Diagnostic Studies  Results Reviewed       Procedure Component Value Units Date/Time    FLU/RSV/COVID - if FLU/RSV clinically relevant [[id number]]  (Normal) Collected: 02/12/24 2050    Lab Status: Final result Specimen: Nares from Nose Updated: 02/12/24 2144     SARS-CoV-2 Negative     INFLUENZA A PCR Negative     INFLUENZA B PCR Negative     RSV PCR Negative    Narrative:      FOR PEDIATRIC PATIENTS - copy/paste COVID Guidelines URL to browser: [email or web address]    SARS-CoV-2 assay is a Nucleic Acid Amplification assay intended for the  qualitative detection of nucleic acid from SARS-CoV-2 in nasopharyngeal  swabs. Results are for the presumptive identification of SARS-CoV-2 RNA.    Positive results are indicative of infection with SARS-CoV-2, the virus  causing COVID-19, but do not rule out bacterial infection or co-infection  with other viruses. Laboratories within the [place] and its  territories are required to report all positive results to the appropriate  public health authorities. Negative results do not preclude SARS-CoV-2  infection and should not be used as the sole basis for treatment or other  patient management decisions. Negative results must be combined with  clinical observations, patient history, and epidemiological information.  This test has not been FDA cleared or approved.    This test has been authorized by FDA under an Emergency Use Authorization  (EUA). This test is only authorized for the duration of time the  declaration that circumstances exist justifying the authorization of the  emergency use of an in vitro diagnostic tests for detection of SARS-CoV-2  virus and/or diagnosis of COVID-19  infection under section 564(b)(1) of  the Act, 21 U.S.C. 360bbb-3(b)(1), unless the authorization is terminated  or revoked sooner. The test has been validated but independent review by FDA  and CLIA is pending.    Test performed using [id number]: This RT-PCR assay targets N2,  a region unique to SARS-CoV-2. A conserved region in the E-gene was chosen  for pan-Sarbecovirus detection which includes SARS-CoV-2.    According to CMS-2020-01-R, this platform meets the definition of high-throughput technology.                   XR chest 2 views   ED Interpretation by Yash Rendon MD (02/12 2146)   Primary reviewed; No acute abnormality                 Procedures  ECG 12 Lead Documentation Only    Date/Time: 2/12/2024 9:49 PM    Performed by: Yash Rendon MD  Authorized by: Yash Rendon MD    ECG reviewed by me, the ED Provider: yes    Patient location:  ED  Rate:     ECG rate:  65    ECG rate assessment: normal    Rhythm:     Rhythm: sinus rhythm    Ectopy:     Ectopy: aberrant    QRS:     QRS axis:  Normal    QRS intervals:  Normal  Conduction:     Conduction: normal    ST segments:     ST segments:  Normal  T waves:     T waves: non-specific             ED Course                               SBIRT 22yo+      Flowsheet Row Most Recent Value   Initial Alcohol Screen: US AUDIT-C     1. How often do you have a drink containing alcohol? 0 Filed at: 02/12/2024 2043   2. How many drinks containing alcohol do you have on a typical day you are drinking?  0 Filed at: 02/12/2024 2043   3a. Male UNDER 65: How often do you have five or more drinks on one occasion? 0 Filed at: 02/12/2024 2043   Audit-C Score 0 Filed at: 02/12/2024 2043   PRERNA: How many times in the past year have you...    Used an illegal drug or used a prescription medication for non-medical reasons? Never Filed at: 02/12/2024 2043                      Medical Decision Making  Influenza-like illness with a benign exam-will do chest x-ray rule out  pneumonia, EKG to rule out pericarditis, COVID flu RSV swabs, treat symptoms.  Likely viral in nature and antibiotics not indicated unless pneumonia is present    Amount and/or Complexity of Data Reviewed  Radiology: ordered.    Risk  OTC drugs.  Prescription drug management.             Disposition  Final diagnoses:   Influenza-like illness     Time reflects when diagnosis was documented in both MDM as applicable and the Disposition within this note       Time User Action Codes Description Comment    2/12/2024  9:47 PM Yash Rendon Add [J11.1] Influenza-like illness           ED Disposition       ED Disposition   Discharge    Condition   Stable    Date/Time   Mon Feb 12, 2024 2147    Comment   Emanuel Cameron discharge to home/self care.                   Follow-up Information       Follow up With Specialties Details Why Contact Info Additional Information    [facility] Medicine Schedule an appointment as soon as possible for a visit in 2 days  [address] [place] [id number] [phone] [facility], [address] [place] [address], [address] [phone]            Patient's Medications   Discharge Prescriptions    METOCLOPRAMIDE (REGLAN) 10 MG TABLET    Take 1 tablet (10 mg total) by mouth every 6 (six) hours as needed (headache)       Start Date: 2/12/2024 End Date: --       Order Dose: 10 mg       Quantity: 8 tablet    Refills: 0    NAPROXEN (NAPROSYN) 250 MG TABLET    Take 1 tablet (250 mg total) by mouth 3 (three) times a day with meals for 7 days       Start Date: 2/12/2024 End Date: 2/19/2024       Order Dose: 250 mg       Quantity: 21 tablet    Refills: 0       No discharge procedures on file.    PDMP Review       None            ED Provider  Electronically Signed by             Yash Rendon MD  02/12/24 3290

## 2025-02-16 ENCOUNTER — HOSPITAL ENCOUNTER (EMERGENCY)
Facility: HOSPITAL | Age: 25
Discharge: HOME | End: 2025-02-16
Attending: EMERGENCY MEDICINE | Admitting: EMERGENCY MEDICINE

## 2025-02-16 VITALS
DIASTOLIC BLOOD PRESSURE: 59 MMHG | SYSTOLIC BLOOD PRESSURE: 137 MMHG | HEART RATE: 54 BPM | RESPIRATION RATE: 16 BRPM | WEIGHT: 206.79 LBS | BODY MASS INDEX: 33.23 KG/M2 | OXYGEN SATURATION: 100 % | TEMPERATURE: 97.9 F | HEIGHT: 66 IN

## 2025-02-16 DIAGNOSIS — G44.209 TENSION HEADACHE: ICD-10-CM

## 2025-02-16 DIAGNOSIS — R45.89 ANXIETY ABOUT HEALTH: Primary | ICD-10-CM

## 2025-02-16 DIAGNOSIS — K21.9 GERD (GASTROESOPHAGEAL REFLUX DISEASE): ICD-10-CM

## 2025-02-16 LAB
ATRIAL RATE: 74 BPM
P AXIS: 63 DEGREES
PR INTERVAL: 166 MS
QRS AXIS: 55 DEGREES
QRSD INTERVAL: 94 MS
QT INTERVAL: 384 MS
QTC INTERVAL: 426 MS
T WAVE AXIS: -3 DEGREES
VENTRICULAR RATE: 74 BPM

## 2025-02-16 PROCEDURE — 93010 ELECTROCARDIOGRAM REPORT: CPT | Performed by: STUDENT IN AN ORGANIZED HEALTH CARE EDUCATION/TRAINING PROGRAM

## 2025-02-16 PROCEDURE — 99285 EMERGENCY DEPT VISIT HI MDM: CPT

## 2025-02-16 PROCEDURE — 99284 EMERGENCY DEPT VISIT MOD MDM: CPT

## 2025-02-16 PROCEDURE — 93005 ELECTROCARDIOGRAM TRACING: CPT

## 2025-02-16 RX ORDER — HYDROXYZINE HYDROCHLORIDE 25 MG/1
25 TABLET, FILM COATED ORAL EVERY 8 HOURS PRN
Qty: 15 TABLET | Refills: 0 | Status: SHIPPED | OUTPATIENT
Start: 2025-02-16

## 2025-02-16 RX ORDER — METOCLOPRAMIDE 10 MG/1
10 TABLET ORAL EVERY 6 HOURS
Qty: 30 TABLET | Refills: 0 | Status: SHIPPED | OUTPATIENT
Start: 2025-02-16

## 2025-02-16 RX ORDER — IBUPROFEN 400 MG/1
400 TABLET, FILM COATED ORAL ONCE
Status: COMPLETED | OUTPATIENT
Start: 2025-02-16 | End: 2025-02-16

## 2025-02-16 RX ORDER — IBUPROFEN 600 MG/1
600 TABLET, FILM COATED ORAL EVERY 6 HOURS PRN
Qty: 30 TABLET | Refills: 0 | Status: SHIPPED | OUTPATIENT
Start: 2025-02-16

## 2025-02-16 RX ORDER — FAMOTIDINE 20 MG/1
20 TABLET, FILM COATED ORAL 2 TIMES DAILY
Qty: 30 TABLET | Refills: 0 | Status: SHIPPED | OUTPATIENT
Start: 2025-02-16

## 2025-02-16 RX ADMIN — IBUPROFEN 400 MG: 400 TABLET, FILM COATED ORAL at 21:25

## 2025-02-17 NOTE — ED PROVIDER NOTES
"Time reflects when diagnosis was documented in both MDM as applicable and the Disposition within this note       Time User Action Codes Description Comment    2/16/2025 11:43 PM Teddy Puckett [R45.89] Anxiety about health     2/16/2025 11:43 PM Teddy Puckett [G44.209] Tension headache     2/16/2025 11:43 PM Teddy Puckett [K21.9] GERD (gastroesophageal reflux disease)           ED Disposition       ED Disposition   Discharge    Condition   Stable    Date/Time   Sun Feb 16, 2025 11:42 PM    Comment   Emanuel Cameron discharge to home/self care.                   Assessment & Plan       Medical Decision Making  24-year-old male presents with headache, nausea, chest discomfort.  Symptoms are intermittent, was given Motrin on arrival in the ER and at time of exam he is asymptomatic.  He has no pertinent findings on his physical exam.  He appears to be very anxious and preoccupied with his health concerns.  The description of his headaches most likely resembles tension headaches and is not highly concerning.  Nausea and chest discomfort are more likely secondary to GERD.  Explained to patient that I do not think that lab work or imaging are going to be of much benefit to him tonight.  He recently had chest x-ray that was unremarkable, and EKG tonight was unremarkable.  Feel that he would benefit more so from consistent outpatient follow-up, for which we will give referral to family medicine as well as information for the [facility] to follow-up.  Will prescribe Motrin for headache, Pepcid for abdominal discomfort/nausea, and hydroxyzine for anxiety.  During our discussion he was most focused on something to \"help him relax\".    Patient expresses understanding of the condition, treatment plan, follow-up instructions, and return precautions.      Risk  Prescription drug management.             Medications   ibuprofen (MOTRIN) tablet 400 mg (400 mg Oral Given 2/16/25 2125)       ED Risk Strat Scores    "                                             History of Present Illness       Chief Complaint   Patient presents with    Headache     Patient reports headache for 1 week, nausea, intermittent chest discomfort. Tylenol with minimal relief.        Past Medical History:   Diagnosis Date    Pericarditis       History reviewed. No pertinent surgical history.   History reviewed. No pertinent family history.   Social History     Tobacco Use    Smoking status: Never     Passive exposure: Never    Smokeless tobacco: Never   Vaping Use    Vaping status: Never Used   Substance Use Topics    Alcohol use: Yes     Comment: occasional    Drug use: Not Currently      E-Cigarette/Vaping    E-Cigarette Use Never User       E-Cigarette/Vaping Substances      I have reviewed and agree with the history as documented.     24-year-old male presents for evaluation of vague intermittent headache, nausea, and chest discomfort.  He was given Motrin on arrival in the ER and by the time he was examined he no longer had a headache and is not nauseous.  Not currently endorsing chest discomfort either.  Does not follow-up with the PCP outpatient.  Does admit to quite a bit of anxiety regarding his health.        Review of Systems   Constitutional:  Negative for chills and fever.   HENT:  Negative for ear pain and sore throat.    Eyes:  Negative for pain and visual disturbance.   Respiratory:  Negative for cough and shortness of breath.    Cardiovascular:  Positive for chest pain. Negative for palpitations.   Gastrointestinal:  Positive for nausea. Negative for abdominal pain and vomiting.   Genitourinary:  Negative for dysuria and hematuria.   Musculoskeletal:  Negative for arthralgias and back pain.   Skin:  Negative for color change and rash.   Neurological:  Positive for headaches. Negative for seizures and syncope.   Psychiatric/Behavioral:  The patient is nervous/anxious.    All other systems reviewed and are negative.          Objective        ED Triage Vitals [02/16/25 2107]   Temperature Pulse Blood Pressure Respirations SpO2 Patient Position - Orthostatic VS   97.9 °F (36.6 °C) 62 147/89 16 100 % Sitting      Temp Source Heart Rate Source BP Location FiO2 (%) Pain Score    Oral Monitor Right arm -- 9      Vitals      Date and Time Temp Pulse SpO2 Resp BP Pain Score FACES Pain Rating User   02/16/25 2245 -- 54 100 % 16 137/59 No Pain -- TMS   02/16/25 2125 -- -- -- -- -- 8 -- TMS   02/16/25 2107 97.9 °F (36.6 °C) 62 100 % 16 147/89 9 headache -- TMS            Physical Exam  Vitals and nursing note reviewed.   Constitutional:       General: He is not in acute distress.     Appearance: He is well-developed.   HENT:      Head: Normocephalic and atraumatic.   Eyes:      Conjunctiva/sclera: Conjunctivae normal.   Cardiovascular:      Rate and Rhythm: Normal rate and regular rhythm.      Heart sounds: No murmur heard.  Pulmonary:      Effort: Pulmonary effort is normal. No respiratory distress.      Breath sounds: Normal breath sounds.   Abdominal:      Palpations: Abdomen is soft.      Tenderness: There is no abdominal tenderness.   Musculoskeletal:         General: No swelling.      Cervical back: Neck supple.   Skin:     General: Skin is warm and dry.      Capillary Refill: Capillary refill takes less than 2 seconds.   Neurological:      Mental Status: He is alert.   Psychiatric:         Mood and Affect: Mood is anxious.         Results Reviewed       None            No orders to display       Procedures    ED Medication and Procedure Management   Prior to Admission Medications   Prescriptions Last Dose Informant Patient Reported? Taking?   methocarbamol (ROBAXIN) 500 mg tablet   No No   Sig: Take 1 tablet (500 mg total) by mouth 2 (two) times a day   metoclopramide (REGLAN) 10 mg tablet   No No   Sig: Take 1 tablet (10 mg total) by mouth every 6 (six) hours as needed (headache)   naproxen (NAPROSYN) 250 mg tablet   No No   Sig: Take 1 tablet (250  mg total) by mouth 3 (three) times a day with meals for 7 days      Facility-Administered Medications: None     Discharge Medication List as of 2/16/2025 11:45 PM        START taking these medications    Details   famotidine (PEPCID) 20 mg tablet Take 1 tablet (20 mg total) by mouth 2 (two) times a day, Starting Sun 2/16/2025, Normal      hydrOXYzine HCL (ATARAX) 25 mg tablet Take 1 tablet (25 mg total) by mouth every 8 (eight) hours as needed for itching, anxiety or allergies, Starting Sun 2/16/2025, Normal      ibuprofen (MOTRIN) 600 mg tablet Take 1 tablet (600 mg total) by mouth every 6 (six) hours as needed for mild pain, Starting Sun 2/16/2025, Normal      !! metoclopramide (Reglan) 10 mg tablet Take 1 tablet (10 mg total) by mouth every 6 (six) hours, Starting Sun 2/16/2025, Normal       !! - Potential duplicate medications found. Please discuss with provider.        CONTINUE these medications which have NOT CHANGED    Details   methocarbamol (ROBAXIN) 500 mg tablet Take 1 tablet (500 mg total) by mouth 2 (two) times a day, Starting Fri 9/30/2022, Normal      !! metoclopramide (REGLAN) 10 mg tablet Take 1 tablet (10 mg total) by mouth every 6 (six) hours as needed (headache), Starting Mon 2/12/2024, Normal      naproxen (NAPROSYN) 250 mg tablet Take 1 tablet (250 mg total) by mouth 3 (three) times a day with meals for 7 days, Starting Mon 2/12/2024, Until Mon 2/19/2024, Normal       !! - Potential duplicate medications found. Please discuss with provider.          ED SEPSIS DOCUMENTATION   Time reflects when diagnosis was documented in both MDM as applicable and the Disposition within this note       Time User Action Codes Description Comment    2/16/2025 11:43 PM Teddy Puckett [R45.89] Anxiety about health     2/16/2025 11:43 PM Teddy Puckett [G44.209] Tension headache     2/16/2025 11:43 PM Teddy Puckett [K21.9] GERD (gastroesophageal reflux disease)                  Teddy Puckett  LISSETH  02/17/25 2145

## 2025-02-17 NOTE — DISCHARGE INSTRUCTIONS
-I have low concern that your symptoms are caused by any emergent condition.  I think that your anxiety is playing a large role in your symptoms.  You can try taking the hydroxyzine once every 8 hours as needed to help relax, this can make you drowsy as well.  You can take ibuprofen for headache.  Sometimes Reglan helps with headache as well.  You can take Pepcid 1-2 times daily to help with the discomfort in your chest and your nausea.  Reglan may help with this as well.    -If you continue to experience these types of symptoms, it is important to follow-up with a primary care provider.  I placed a referral to family medicine to help facilitate this, they will call you.  You can also follow-up with the [facility] practice listed in your paperwork.

## 2025-02-17 NOTE — ED NOTES
Patient reports feeling improvement in headache after ibuprofen given in triage. Denies pain at present.      Felicia Murray RN  02/16/25 3057

## 2025-05-27 ENCOUNTER — APPOINTMENT (EMERGENCY)
Dept: CT IMAGING | Facility: HOSPITAL | Age: 25
End: 2025-05-27

## 2025-05-27 ENCOUNTER — HOSPITAL ENCOUNTER (EMERGENCY)
Facility: HOSPITAL | Age: 25
Discharge: HOME | End: 2025-05-27
Attending: EMERGENCY MEDICINE | Admitting: EMERGENCY MEDICINE

## 2025-05-27 VITALS
WEIGHT: 206.35 LBS | HEART RATE: 71 BPM | TEMPERATURE: 98.6 F | OXYGEN SATURATION: 98 % | SYSTOLIC BLOOD PRESSURE: 155 MMHG | BODY MASS INDEX: 33.31 KG/M2 | RESPIRATION RATE: 16 BRPM | DIASTOLIC BLOOD PRESSURE: 69 MMHG

## 2025-05-27 DIAGNOSIS — Z75.8 DOES NOT HAVE PRIMARY CARE PROVIDER: ICD-10-CM

## 2025-05-27 DIAGNOSIS — R10.2 PERINEAL PAIN IN MALE: Primary | ICD-10-CM

## 2025-05-27 LAB
ANION GAP SERPL CALCULATED.3IONS-SCNC: 6 MMOL/L (ref 4–13)
BACTERIA UR QL AUTO: NORMAL /HPF
BASOPHILS # BLD AUTO: 0.05 THOUSANDS/ÂΜL (ref 0–0.1)
BASOPHILS NFR BLD AUTO: 1 % (ref 0–1)
BILIRUB UR QL STRIP: NEGATIVE
BUN SERPL-MCNC: 19 MG/DL (ref 5–25)
CALCIUM SERPL-MCNC: 9.4 MG/DL (ref 8.4–10.2)
CHLORIDE SERPL-SCNC: 102 MMOL/L (ref 96–108)
CLARITY UR: CLEAR
CO2 SERPL-SCNC: 28 MMOL/L (ref 21–32)
COLOR UR: ABNORMAL
CREAT SERPL-MCNC: 1.01 MG/DL (ref 0.6–1.3)
EOSINOPHIL # BLD AUTO: 0.12 THOUSAND/ÂΜL (ref 0–0.61)
EOSINOPHIL NFR BLD AUTO: 2 % (ref 0–6)
ERYTHROCYTE [DISTWIDTH] IN BLOOD BY AUTOMATED COUNT: 14.1 % (ref 11.6–15.1)
GFR SERPL CREATININE-BSD FRML MDRD: 102 ML/MIN/1.73SQ M
GLUCOSE SERPL-MCNC: 103 MG/DL (ref 65–140)
GLUCOSE UR STRIP-MCNC: NEGATIVE MG/DL
HCT VFR BLD AUTO: 44 % (ref 36.5–49.3)
HGB BLD-MCNC: 14.4 G/DL (ref 12–17)
HGB UR QL STRIP.AUTO: NEGATIVE
IMM GRANULOCYTES # BLD AUTO: 0.02 THOUSAND/UL (ref 0–0.2)
IMM GRANULOCYTES NFR BLD AUTO: 0 % (ref 0–2)
KETONES UR STRIP-MCNC: NEGATIVE MG/DL
LEUKOCYTE ESTERASE UR QL STRIP: NEGATIVE
LYMPHOCYTES # BLD AUTO: 3.07 THOUSANDS/ÂΜL (ref 0.6–4.47)
LYMPHOCYTES NFR BLD AUTO: 44 % (ref 14–44)
MCH RBC QN AUTO: 25.9 PG (ref 26.8–34.3)
MCHC RBC AUTO-ENTMCNC: 32.7 G/DL (ref 31.4–37.4)
MCV RBC AUTO: 79 FL (ref 82–98)
MONOCYTES # BLD AUTO: 0.57 THOUSAND/ÂΜL (ref 0.17–1.22)
MONOCYTES NFR BLD AUTO: 8 % (ref 4–12)
NEUTROPHILS # BLD AUTO: 3.15 THOUSANDS/ÂΜL (ref 1.85–7.62)
NEUTS SEG NFR BLD AUTO: 45 % (ref 43–75)
NITRITE UR QL STRIP: NEGATIVE
NON-SQ EPI CELLS URNS QL MICRO: NORMAL /HPF
NRBC BLD AUTO-RTO: 0 /100 WBCS
PH UR STRIP.AUTO: 7 [PH]
PLATELET # BLD AUTO: 227 THOUSANDS/UL (ref 149–390)
PMV BLD AUTO: 10.3 FL (ref 8.9–12.7)
POTASSIUM SERPL-SCNC: 3.8 MMOL/L (ref 3.5–5.3)
PROT UR STRIP-MCNC: ABNORMAL MG/DL
RBC # BLD AUTO: 5.57 MILLION/UL (ref 3.88–5.62)
RBC #/AREA URNS AUTO: NORMAL /HPF
SODIUM SERPL-SCNC: 136 MMOL/L (ref 135–147)
SP GR UR STRIP.AUTO: 1.03 (ref 1–1.03)
UROBILINOGEN UR STRIP-ACNC: <2 MG/DL
WBC # BLD AUTO: 6.98 THOUSAND/UL (ref 4.31–10.16)
WBC #/AREA URNS AUTO: NORMAL /HPF

## 2025-05-27 PROCEDURE — 85025 COMPLETE CBC W/AUTO DIFF WBC: CPT

## 2025-05-27 PROCEDURE — 96374 THER/PROPH/DIAG INJ IV PUSH: CPT

## 2025-05-27 PROCEDURE — 99284 EMERGENCY DEPT VISIT MOD MDM: CPT

## 2025-05-27 PROCEDURE — 74176 CT ABD & PELVIS W/O CONTRAST: CPT

## 2025-05-27 PROCEDURE — 80048 BASIC METABOLIC PNL TOTAL CA: CPT

## 2025-05-27 PROCEDURE — 81001 URINALYSIS AUTO W/SCOPE: CPT

## 2025-05-27 PROCEDURE — 36415 COLL VENOUS BLD VENIPUNCTURE: CPT

## 2025-05-27 RX ORDER — KETOROLAC TROMETHAMINE 30 MG/ML
15 INJECTION, SOLUTION INTRAMUSCULAR; INTRAVENOUS ONCE
Status: COMPLETED | OUTPATIENT
Start: 2025-05-27 | End: 2025-05-27

## 2025-05-27 RX ADMIN — KETOROLAC TROMETHAMINE 15 MG: 30 INJECTION, SOLUTION INTRAMUSCULAR; INTRAVENOUS at 01:46

## 2025-05-27 NOTE — ED PROVIDER NOTES
Time reflects when diagnosis was documented in both MDM as applicable and the Disposition within this note       Time User Action Codes Description Comment    5/27/2025  2:47 AM Teddy Puckett [R10.2] Perineal pain in male     5/27/2025  2:48 AM Teddy Puckett [Z75.8] Does not have primary care provider           ED Disposition       ED Disposition   Discharge    Condition   Stable    Date/Time   Tue May 27, 2025  2:45 AM    Comment   Emanuel Cameron discharge to home/self care.                   Assessment & Plan       Medical Decision Making  25-year-old male presents for evaluation of perineal pain.  Also recently had severe right flank pain.  On exam appears mildly uncomfortable.  Is mildly hypertensive.  Mild TTP of perineum, no mass or rash appreciated, no drainage.  Testicles nontender, no swelling of bilateral epididymis and no palpable varicocele.  Penis normal.  Abdomen soft and nontender.    Likely nonspecific inflammation.  Cannot easily rule out prostatitis, ureteral/bladder stone, UTI.  Will assess with CBC, CMP, UA, CT renal stone study.  Will give Toradol for pain.    Blood work and urine unremarkable.  CT scan with no acute pathology.  Educated patient on findings.  No specific cause of patient's symptoms identified, likely nonspecific inflammation.  Recommend treat with Motrin at home as needed and follow-up with family medicine if persistent.  Patient expresses understanding of the condition, treatment plan, follow-up instructions, and return precautions.      Amount and/or Complexity of Data Reviewed  Labs: ordered.  Radiology: ordered.    Risk  Prescription drug management.             Medications   ketorolac (TORADOL) injection 15 mg (15 mg Intravenous Given 5/27/25 0146)       ED Risk Strat Scores                    No data recorded                            History of Present Illness       Chief Complaint   Patient presents with    Groin Pain     States groin pain since  "yesterday. Denies injury or pain with urination. States about 1 week ago had kidney pain that has since resolved.       Past Medical History[1]   Past Surgical History[2]   Family History[3]   Social History[4]   E-Cigarette/Vaping    E-Cigarette Use Never User       E-Cigarette/Vaping Substances      I have reviewed and agree with the history as documented.     25-year-old male presents for evaluation of perineal pain.  States he initially had a severe pain in his right flank, thinks this was a couple days ago.  Since then has developed pain \"behind his testicles\".  Denies associated symptoms like fevers, chills, nausea, vomiting, dysuria, difficulty urinating, hematuria, rectal pain, diarrhea.        Review of Systems   Constitutional:  Negative for chills and fever.   HENT:  Negative for ear pain and sore throat.    Eyes:  Negative for pain and visual disturbance.   Respiratory:  Negative for cough and shortness of breath.    Cardiovascular:  Negative for chest pain and palpitations.   Gastrointestinal:  Negative for abdominal pain and vomiting.   Genitourinary:  Positive for flank pain. Negative for dysuria and hematuria.        Perineal pain   Musculoskeletal:  Negative for arthralgias and back pain.   Skin:  Negative for color change and rash.   Neurological:  Negative for seizures and syncope.   All other systems reviewed and are negative.          Objective       ED Triage Vitals [05/27/25 0036]   Temperature Pulse Blood Pressure Respirations SpO2 Patient Position - Orthostatic VS   98.6 °F (37 °C) 71 155/69 16 98 % Sitting      Temp Source Heart Rate Source BP Location FiO2 (%) Pain Score    Oral Monitor Right arm -- 8      Vitals      Date and Time Temp Pulse SpO2 Resp BP Pain Score FACES Pain Rating User   05/27/25 0146 -- -- -- -- -- 7 -- SH   05/27/25 0036 98.6 °F (37 °C) 71 98 % 16 155/69 8 -- RC            Physical Exam  Vitals and nursing note reviewed.   Constitutional:       General: He is not in " acute distress.     Appearance: He is well-developed.   HENT:      Head: Normocephalic and atraumatic.     Eyes:      Conjunctiva/sclera: Conjunctivae normal.       Cardiovascular:      Rate and Rhythm: Normal rate and regular rhythm.      Heart sounds: No murmur heard.  Pulmonary:      Effort: Pulmonary effort is normal. No respiratory distress.      Breath sounds: Normal breath sounds.   Abdominal:      Palpations: Abdomen is soft.      Tenderness: There is no abdominal tenderness.   Genitourinary:     Pubic Area: No rash.       Testes: Normal.      Epididymis:      Right: Normal.      Left: Normal.     Musculoskeletal:         General: No swelling.      Cervical back: Neck supple.     Skin:     General: Skin is warm and dry.      Capillary Refill: Capillary refill takes less than 2 seconds.     Neurological:      Mental Status: He is alert.     Psychiatric:         Mood and Affect: Mood normal.         Results Reviewed       Procedure Component Value Units Date/Time    Basic metabolic panel [[id number]] Collected: 05/27/25 0145    Lab Status: Final result Specimen: Blood from Arm, Right Updated: 05/27/25 0213     Sodium 136 mmol/L      Potassium 3.8 mmol/L      Chloride 102 mmol/L      CO2 28 mmol/L      ANION GAP 6 mmol/L      BUN 19 mg/dL      Creatinine 1.01 mg/dL      Glucose 103 mg/dL      Calcium 9.4 mg/dL      eGFR 102 ml/min/1.73sq m     Narrative:      [employer] guidelines for Chronic Kidney Disease (CKD):     Stage 1 with normal or high GFR (GFR > 90 mL/min/1.73 square meters)    Stage 2 Mild CKD (GFR = 60-89 mL/min/1.73 square meters)    Stage 3A Moderate CKD (GFR = 45-59 mL/min/1.73 square meters)    Stage 3B Moderate CKD (GFR = 30-44 mL/min/1.73 square meters)    Stage 4 Severe CKD (GFR = 15-29 mL/min/1.73 square meters)    Stage 5 End Stage CKD (GFR <15 mL/min/1.73 square meters)  Note: GFR calculation is accurate only with a steady state creatinine    Urine Microscopic  [[id number]]  (Normal) Collected: 05/27/25 0140    Lab Status: Final result Specimen: Urine, Clean Catch Updated: 05/27/25 0206     RBC, UA None Seen /hpf      WBC, UA None Seen /hpf      Epithelial Cells None Seen /hpf      Bacteria, UA None Seen /hpf     UA w Reflex to Microscopic w Reflex to Culture [[id number]]  (Abnormal) Collected: 05/27/25 0140    Lab Status: Final result Specimen: Urine, Clean Catch Updated: 05/27/25 0204     Color, UA Light Yellow     Clarity, UA Clear     Specific Gravity, UA 1.032     pH, UA 7.0     Leukocytes, UA Negative     Nitrite, UA Negative     Protein, UA Trace mg/dl      Glucose, UA Negative mg/dl      Ketones, UA Negative mg/dl      Urobilinogen, UA <2.0 mg/dl      Bilirubin, UA Negative     Occult Blood, UA Negative    CBC and differential [[id number]]  (Abnormal) Collected: 05/27/25 0145    Lab Status: Final result Specimen: Blood from Arm, Right Updated: 05/27/25 0201     WBC 6.98 Thousand/uL      RBC 5.57 Million/uL      Hemoglobin 14.4 g/dL      Hematocrit 44.0 %      MCV 79 fL      MCH 25.9 pg      MCHC 32.7 g/dL      RDW 14.1 %      MPV 10.3 fL      Platelets 227 Thousands/uL      nRBC 0 /100 WBCs      Segmented % 45 %      Immature Grans % 0 %      Lymphocytes % 44 %      Monocytes % 8 %      Eosinophils Relative 2 %      Basophils Relative 1 %      Absolute Neutrophils 3.15 Thousands/µL      Absolute Immature Grans 0.02 Thousand/uL      Absolute Lymphocytes 3.07 Thousands/µL      Absolute Monocytes 0.57 Thousand/µL      Eosinophils Absolute 0.12 Thousand/µL      Basophils Absolute 0.05 Thousands/µL             CT renal stone study abdomen pelvis wo contrast   Final Interpretation by Saad Vick MD (05/27 0241)      No acute pathology. Specifically, no urinary tract calculi or obstructive uropathy.         Workstation performed: [id number]             Procedures    ED Medication and Procedure Management   Prior to Admission Medications   Prescriptions Last Dose  Informant Patient Reported? Taking?   famotidine (PEPCID) 20 mg tablet   No No   Sig: Take 1 tablet (20 mg total) by mouth 2 (two) times a day   hydrOXYzine HCL (ATARAX) 25 mg tablet   No No   Sig: Take 1 tablet (25 mg total) by mouth every 8 (eight) hours as needed for itching, anxiety or allergies   ibuprofen (MOTRIN) 600 mg tablet   No No   Sig: Take 1 tablet (600 mg total) by mouth every 6 (six) hours as needed for mild pain   methocarbamol (ROBAXIN) 500 mg tablet   No No   Sig: Take 1 tablet (500 mg total) by mouth 2 (two) times a day   metoclopramide (REGLAN) 10 mg tablet   No No   Sig: Take 1 tablet (10 mg total) by mouth every 6 (six) hours as needed (headache)   metoclopramide (Reglan) 10 mg tablet   No No   Sig: Take 1 tablet (10 mg total) by mouth every 6 (six) hours   naproxen (NAPROSYN) 250 mg tablet   No No   Sig: Take 1 tablet (250 mg total) by mouth 3 (three) times a day with meals for 7 days      Facility-Administered Medications: None     Discharge Medication List as of 5/27/2025  2:54 AM        CONTINUE these medications which have NOT CHANGED    Details   famotidine (PEPCID) 20 mg tablet Take 1 tablet (20 mg total) by mouth 2 (two) times a day, Starting Sun 2/16/2025, Normal      hydrOXYzine HCL (ATARAX) 25 mg tablet Take 1 tablet (25 mg total) by mouth every 8 (eight) hours as needed for itching, anxiety or allergies, Starting Sun 2/16/2025, Normal      ibuprofen (MOTRIN) 600 mg tablet Take 1 tablet (600 mg total) by mouth every 6 (six) hours as needed for mild pain, Starting Sun 2/16/2025, Normal      methocarbamol (ROBAXIN) 500 mg tablet Take 1 tablet (500 mg total) by mouth 2 (two) times a day, Starting Fri 9/30/2022, Normal      !! metoclopramide (REGLAN) 10 mg tablet Take 1 tablet (10 mg total) by mouth every 6 (six) hours as needed (headache), Starting Mon 2/12/2024, Normal      !! metoclopramide (Reglan) 10 mg tablet Take 1 tablet (10 mg total) by mouth every 6 (six) hours, Starting Sun  2/16/2025, Normal      naproxen (NAPROSYN) 250 mg tablet Take 1 tablet (250 mg total) by mouth 3 (three) times a day with meals for 7 days, Starting Mon 2/12/2024, Until Mon 2/19/2024, Normal       !! - Potential duplicate medications found. Please discuss with provider.          ED SEPSIS DOCUMENTATION   Time reflects when diagnosis was documented in both MDM as applicable and the Disposition within this note       Time User Action Codes Description Comment    5/27/2025  2:47 AM Teddy Puckett [R10.2] Perineal pain in male     5/27/2025  2:48 AM Teddy Puckett [Z75.8] Does not have primary care provider                      [1]   Past Medical History:  Diagnosis Date    Pericarditis    [2] No past surgical history on file.  [3] No family history on file.  [4]   Social History  Tobacco Use    Smoking status: Never     Passive exposure: Never    Smokeless tobacco: Never   Vaping Use    Vaping status: Never Used   Substance Use Topics    Alcohol use: Yes     Comment: occasional    Drug use: Not Currently        Teddy Puckett PA-C  05/27/25 0425

## 2025-05-27 NOTE — DISCHARGE INSTRUCTIONS
There were no abnormal findings in your workup tonight.  I recommend that you follow-up with a primary care provider if your symptoms persist.  You can take ibuprofen for pain as needed.    No se encontraron hallazgos anormales en lynn evaluación de esta noche. Le recomiendo que consulte con un médico de cabecera si los síntomas persisten. Puede yunier ibuprofeno para el dolor según sea necesario.